# Patient Record
Sex: MALE | Race: WHITE | Employment: OTHER | URBAN - METROPOLITAN AREA
[De-identification: names, ages, dates, MRNs, and addresses within clinical notes are randomized per-mention and may not be internally consistent; named-entity substitution may affect disease eponyms.]

---

## 2016-09-13 LAB
ANION GAP SERPL CALCULATED.3IONS-SCNC: 5 MMOL/L
BUN SERPL-MCNC: 23 MG/DL
CALCIUM SERPL-MCNC: 9.3 MG/DL
CHLORIDE SERPLBLD-SCNC: 108 MMOL/L
CO2 SERPL-SCNC: 28 MMOL/L
CREAT SERPL-MCNC: 1.48 MG/DL
GFR SERPL CREATININE-BSD FRML MDRD: 44 ML/MIN/1.73M2
GLUCOSE SERPL-MCNC: 141 MG/DL (ref 70–99)
POTASSIUM SERPL-SCNC: 4.5 MMOL/L
SODIUM SERPL-SCNC: 141 MMOL/L

## 2017-01-04 ENCOUNTER — ANESTHESIA EVENT (OUTPATIENT)
Dept: SURGERY | Facility: CLINIC | Age: 82
End: 2017-01-04
Payer: MEDICARE

## 2017-01-04 ENCOUNTER — ANESTHESIA (OUTPATIENT)
Dept: SURGERY | Facility: CLINIC | Age: 82
End: 2017-01-04
Payer: MEDICARE

## 2017-01-04 PROCEDURE — S0077 INJECTION, CLINDAMYCIN PHOSP: HCPCS | Performed by: NEUROLOGICAL SURGERY

## 2017-01-04 PROCEDURE — 25000125 ZZHC RX 250: Performed by: NEUROLOGICAL SURGERY

## 2017-01-04 PROCEDURE — 25800025 ZZH RX 258: Performed by: NURSE ANESTHETIST, CERTIFIED REGISTERED

## 2017-01-04 PROCEDURE — 25000125 ZZHC RX 250: Performed by: NURSE ANESTHETIST, CERTIFIED REGISTERED

## 2017-01-04 RX ORDER — FENTANYL CITRATE 50 UG/ML
INJECTION, SOLUTION INTRAMUSCULAR; INTRAVENOUS PRN
Status: DISCONTINUED | OUTPATIENT
Start: 2017-01-04 | End: 2017-01-04

## 2017-01-04 RX ORDER — GLYCOPYRROLATE 0.2 MG/ML
INJECTION, SOLUTION INTRAMUSCULAR; INTRAVENOUS PRN
Status: DISCONTINUED | OUTPATIENT
Start: 2017-01-04 | End: 2017-01-04

## 2017-01-04 RX ORDER — PROPOFOL 10 MG/ML
INJECTION, EMULSION INTRAVENOUS CONTINUOUS PRN
Status: DISCONTINUED | OUTPATIENT
Start: 2017-01-04 | End: 2017-01-04

## 2017-01-04 RX ORDER — SODIUM CHLORIDE, SODIUM LACTATE, POTASSIUM CHLORIDE, CALCIUM CHLORIDE 600; 310; 30; 20 MG/100ML; MG/100ML; MG/100ML; MG/100ML
INJECTION, SOLUTION INTRAVENOUS CONTINUOUS PRN
Status: DISCONTINUED | OUTPATIENT
Start: 2017-01-04 | End: 2017-01-04

## 2017-01-04 RX ADMIN — PHENYLEPHRINE HYDROCHLORIDE 100 MCG: 10 INJECTION, SOLUTION INTRAMUSCULAR; INTRAVENOUS; SUBCUTANEOUS at 08:26

## 2017-01-04 RX ADMIN — PHENYLEPHRINE HYDROCHLORIDE 200 MCG: 10 INJECTION, SOLUTION INTRAMUSCULAR; INTRAVENOUS; SUBCUTANEOUS at 08:08

## 2017-01-04 RX ADMIN — FENTANYL CITRATE 50 MCG: 50 INJECTION, SOLUTION INTRAMUSCULAR; INTRAVENOUS at 07:36

## 2017-01-04 RX ADMIN — SODIUM CHLORIDE, POTASSIUM CHLORIDE, SODIUM LACTATE AND CALCIUM CHLORIDE: 600; 310; 30; 20 INJECTION, SOLUTION INTRAVENOUS at 07:28

## 2017-01-04 RX ADMIN — CLINDAMYCIN PHOSPHATE 900 MG: 18 INJECTION, SOLUTION INTRAVENOUS at 07:44

## 2017-01-04 RX ADMIN — PHENYLEPHRINE HYDROCHLORIDE 100 MCG: 10 INJECTION, SOLUTION INTRAMUSCULAR; INTRAVENOUS; SUBCUTANEOUS at 08:23

## 2017-01-04 RX ADMIN — PROPOFOL 75 MCG/KG/MIN: 10 INJECTION, EMULSION INTRAVENOUS at 07:38

## 2017-01-04 RX ADMIN — PHENYLEPHRINE HYDROCHLORIDE 100 MCG: 10 INJECTION, SOLUTION INTRAMUSCULAR; INTRAVENOUS; SUBCUTANEOUS at 08:05

## 2017-01-04 RX ADMIN — GLYCOPYRROLATE 0.2 MG: 0.2 INJECTION, SOLUTION INTRAMUSCULAR; INTRAVENOUS at 08:01

## 2017-01-04 ASSESSMENT — ENCOUNTER SYMPTOMS: ORTHOPNEA: 0

## 2017-01-04 ASSESSMENT — NEW YORK HEART ASSOCIATION (NYHA) CLASSIFICATION: NYHA FUNCTIONAL CLASS: I

## 2017-01-04 ASSESSMENT — LIFESTYLE VARIABLES: TOBACCO_USE: 1

## 2017-01-04 NOTE — ANESTHESIA POSTPROCEDURE EVALUATION
Patient: Paul Ingram    REPLACE DEEP BRAIN STIMULATION GENERATOR / BATTERY (Left Chest)  Additional InformationProcedure(s):  Replacement Of Left Deep Brain Stimulator Generator/Battery, Model Activa SC, Over The Left Chest Wall - Wound Class: I-Clean    Diagnosis:Essential Tremor  Diagnosis Additional Information: No value filed.    Anesthesia Type:  MAC    Note:  Anesthesia Post Evaluation    Patient location during evaluation: PACU  Patient participation: Able to fully participate in evaluation  Level of consciousness: awake and alert  Pain management: adequate  Airway patency: patent  Cardiovascular status: acceptable  Respiratory status: acceptable  Hydration status: acceptable  PONV: none     Anesthetic complications: None          Last vitals:  Filed Vitals:    01/04/17 0915 01/04/17 0930 01/04/17 0945   BP: 135/80 124/96 147/72   Pulse:      Temp: 36.4  C (97.6  F)     Resp: 16 16 16   SpO2: 96% 96% 95%       Electronically Signed By: Artemio Phillips MD  January 4, 2017  12:45 PM

## 2017-01-04 NOTE — ANESTHESIA CARE TRANSFER NOTE
Patient: Paul Ingram    REPLACE DEEP BRAIN STIMULATION GENERATOR / BATTERY (Left Chest)  Additional InformationProcedure(s):  Replacement Of Left Deep Brain Stimulator Generator/Battery, Model Activa SC, Over The Left Chest Wall - Wound Class: I-Clean    Diagnosis: Essential Tremor  Diagnosis Additional Information: No value filed.    Anesthesia Type:   MAC     Note:  Airway :Nasal Cannula  Patient transferred to:Phase II        Vitals: (Last set prior to Anesthesia Care Transfer)              Electronically Signed By: SIS Verdin CRNA  January 4, 2017  8:48 AM

## 2017-01-06 ENCOUNTER — CARE COORDINATION (OUTPATIENT)
Dept: NEUROSURGERY | Facility: CLINIC | Age: 82
End: 2017-01-06

## 2017-01-06 NOTE — PROGRESS NOTES
Neurosurgery Discharge Coordination Note     Attending physician:    Surgery performed: DBS battery replacement  Date of Discharge: 1/4/16  Discharge to: Home     Current status: Patient states he is feeling fine and does not have any pain. Denies redness, swelling,increased tenderness or elevated temp. Reports Incision CDI without signs of infection.  Denies current bowel or bladder issues    Discharge instructions and medications reviewed with patient.  Follow up appointments/imaging/tests needed: 1/16/17 at 3:00 - wound check with Dr. Rosenthal    RN triage/on call number given: [phone]/ [phone]

## 2017-01-16 ENCOUNTER — OFFICE VISIT (OUTPATIENT)
Dept: NEUROSURGERY | Facility: CLINIC | Age: 82
End: 2017-01-16

## 2017-01-16 VITALS
SYSTOLIC BLOOD PRESSURE: 95 MMHG | HEART RATE: 63 BPM | DIASTOLIC BLOOD PRESSURE: 46 MMHG | WEIGHT: 181.3 LBS | BODY MASS INDEX: 25.96 KG/M2 | HEIGHT: 70 IN

## 2017-01-16 DIAGNOSIS — Z45.42 END OF BATTERY LIFE OF DEEP BRAIN STIMULATOR: ICD-10-CM

## 2017-01-16 DIAGNOSIS — G25.0 ESSENTIAL TREMOR: Primary | ICD-10-CM

## 2017-01-16 ASSESSMENT — PAIN SCALES - GENERAL: PAINLEVEL: NO PAIN (0)

## 2017-01-16 NOTE — LETTER
1/16/2017       RE: Paul Ingram  [id number]  [place] [address]     Dear Colleague,    Thank you for referring your patient, Paul Ingram, to the [facility] NEUROS[facility]ERY at [facility]. Please see a copy of my visit note below.    HISTORY AND PHYSICAL EXAM    Chief Complaint   Patient presents with     RECHECK     S/p Activa SC generator over right chest wall replacement; Essential Tremor       HISTORY OF PRESENT ILLNESS  We saw Mr. Paul Ingram back in Neurosurgery Clinic for follow-up of his generator/battery replacement over his right chest wall. He is a 89 year old male with history of Essential Tremor. He underwent replacement of his generator/battery replacement, model Activa SC, over his right chest wall on 01/04/2017. The procedure was without complications and he was sent home as a same day patient. Today, he is doing well. He does not report a difference in his tremor since his battery change, but does notice an increase in his tremor when it is turned off. He denies any issues with his wounds and denies fevers, chills, nausea, vomiting, dizziness, or wound drainage.     Past Medical History   Diagnosis Date     Hypertension      Ischemic cardiomyopathy      Dyspnea on exertion      Coronary artery disease      Difficulty in walking(719.7)      uses cane     History of blood transfusion      family donation     Post herpetic neuralgia      Pulmonary embolism (H) 2013     Hyperlipidaemia      Heart attack (H) 7/2014     NSTEMI     PAD (peripheral artery disease) (H)      had stent left carotid--> removed     Abdominal aneurysm without mention of rupture      PUD (peptic ulcer disease) 1999?     hx of H Pylori     RBBB        Past Surgical History   Procedure Laterality Date     Deep brain stimulator  2008     Replace pulse generator subcutaneous  3/19/2013     Procedure: REPLACE PULSE GENERATOR SUBCUTANEOUS;  Left Deep Brain Stimulator  Battery/Generator Replacement;  Surgeon: Deepali Padilla MD;  Location: [facility] OR     Coronary artery bypass  83 and 3/99     83:X 4 grafts, 99: LIMA to LAD, SVG to PDA, SVG to 2nd OM     Carotid endarterectomy  2005     bilateral     Replace deep brain stimulation generator / battery Left 1/4/2017     Procedure: REPLACE DEEP BRAIN STIMULATION GENERATOR / BATTERY;  Surgeon: Lauri Rosenthal MD;  Location: [facility] OR       Family History   Problem Relation Age of Onset     CANCER Mother      C.A.D. Mother      DIABETES Father      CANCER Brother      C.A.D. Sister      [facility]     Social History     Social History     Marital Status:      Spouse Name: N/A     Number of Children: N/A     Years of Education: N/A     Occupational History     Not on file.     Social History Main Topics     Smoking status: Former Smoker -- 1.00 packs/day for 33 years     Types: Cigarettes, Pipe, Cigars     Quit date: 03/12/1959     Smokeless tobacco: Never Used     Alcohol Use: Yes      Comment: occ.     Drug Use: No     Sexual Activity: Not on file     Other Topics Concern     Caffeine Concern No     tea     Sleep Concern No     Special Diet No     Exercise No     some walking with errands     Seat Belt Yes     Social History Narrative    .  Lives with daughter on [address]/3 acre.  Active.    3 children - 1 with CAD and asbestosis exposure.    No family history of bleeding, clotting disorders or complications with anesthesia.            Allergies   Allergen Reactions     Aspirin      bleeding     Atorvastatin      Leg weakness     Penicillins Rash     Sulphadimidine [Sulfamethazine] Rash       Current Outpatient Prescriptions   Medication     naproxen sodium (ALEVE) 220 MG tablet     isosorbide mononitrate (IMDUR) 30 MG 24 hr tablet     lisinopril (PRINIVIL/ZESTRIL) 2.5 MG tablet     rosuvastatin (CRESTOR) 40 MG tablet     spironolactone (ALDACTONE) 25 MG tablet     carboxymethylcellulose (REFRESH PLUS) 0.5 % SOLN     fish oil-omega-3  fatty acids (FISH OIL) 1000 MG capsule     primidone (MYSOLINE) 50 MG tablet     metoprolol (TOPROL-XL) 25 MG 24 hr tablet     nitroglycerin (NITROSTAT) 0.4 MG SL tablet     No current facility-administered medications for this visit.       REVIEW OF SYSTEMS - Updated 1/16/17. Also see pertinent negatives in HPI.  General: Negative for chills/sweats/fever. difficulty sleeping, headache, recent fatigue, or weight gain/loss.  Eyes: POSITIVE for cataract surgery. Negative for blurred vision, crossed eyes, double vision, recent eye infections, vision flashes, or vision halos.  Ears/Nose/Mouth/Throat: POSITIVE for ear discharge, hearing loss, and nosebleeds. Negative for bleeding gums, difficulty swallowing, earache, hoarseness, tinnitus, or sinus problems.  Respiratory:Negative for chronic cough, coughing blood, night sweats, shortness of breath, Tuberculosis, or wheezing.  Cardiovascular: POSITIVE for chest pain and poor circulation. Negative for dyspnea at night, heart murmur, palpitations, pacemaker, pacemaker, swollen legs/feet, or varicose veins.  Gastrointestinal: POSITIVE for melena and hematochezia. Negative for chronic diarrhea, heartburn, Hepatitis A/B/C, increasing constipation, Liver Disease, nausea, or vomiting.   Genitourinary: Negative for Urinary retention, genital discharge, urinary incontinence, prostate problems, urgency, or UTI.   Neurological: POSITIVE for tingling in hands/arms/legs. Negative for syncope, headaches, numbness of arms/legs, memory problems, or seizures.  Psychological: Negative for anxiety, depression, panic attacks, or restlessness.  Skin: POSITIVE for chronic skin itching in chest and unusual moles. Negative for color changes in hand/feet when cold, poor scarring, non-healing ulcers, skin rashes/hives.  Musculoskeletal: Negative for arthritis, joint swelling in hands/wrists/hips/knees/joints, muscle tenderness in arms/legs, or osteoporosis.  Endocrine: Negative for excessive  "thirst/hunger, intolerance for warm rooms, loss of libido, multiple broken bones, rapid weight gain/loss, galactorrhea, or thyroid issues.  Hematologic/Lymphatic: POSITIVE for easy skin bruising. Negative for significant fatigue, prolonged bleeding, tender glands/lymph nodes.  Allergies: Negative for asthma or hay fever.      PHYSICAL EXAM  BP 95/46 mmHg  Pulse 63  Ht 1.778 m (5' 10\")  Wt 82.237 kg (181 lb 4.8 oz)  BMI 26.01 kg/m2    General: Awake, alert, oriented. Well nourished, well developed, he is not in any acute distress.  Incisions: Incision over right chest wall healing well. NO fuid collection or erythema. No exposed hardware.   Neurological  Awake, alert and oriented to date, time, place and person. Speech fluent.   Pupils equal, round, reactive to light.  Extraocular movement intact.  Facial sensation intact.  Face symmetric.  Moves all extremities well.   Sensation grossly intact.    ASSESSMENT  89 year old male with a history of Essential Tremor. S/p Activa SC generator replacement over right chest wall.    He did well with the surgery and his postoperative course has been uneventful. His wound is healing well.    PLAN  1. We will see him in clinic as needed.    I, Nancy Elizalde, am serving as a scribe to document services personally performed by Lauri Rosenthal MD, PhD, based upon my observations and the provider's statements to me. All documentation has been reviewed and edited by the aforementioned doctor prior to being entered into the official medical record.    I, Lauri Rosenthal, attest that above named individual is acting in scribe capacity, has observed my performance of the services and has documented them in accordance with my direction. The documentation recorded by the scribe accurately reflects the service I personally performed and the decisions made by me. The document was also partially recorded by me and the entire document was edited by me as well.     Again, thank you for " allowing me to participate in the care of your patient.      Sincerely,  Lauri Rosenthal MD

## 2017-01-16 NOTE — MR AVS SNAPSHOT
After Visit Summary   2017    Paul Ingram    MRN: [id number]           Patient Information     Date Of Birth          10/3/1918        Visit Information        Provider Department      2017 3:00 PM Lauri Rosenthal MD [facility] Neurosurgery        Today's Diagnoses     Essential tremor    -  1    End of battery life of deep brain stimulator           Follow-ups after your visit        Your next 10 appointments already scheduled     Dec 15, 2017 12:50 PM CST   (Arrive by 12:35 PM)   Return Movement Disorder with Manjeet Bragg MD   [facility] Neurology ([facility])    [address] [place] [id number] [phone]              Who to contact     Please call your clinic at [phone] to:    Ask questions about your health    Make or cancel appointments    Discuss your medicines    Learn about your test results    Speak to your doctor   If you have compliments or concerns about an experience at your clinic, or if you wish to file a complaint, please contact [facility] Patient Relations at [phone] or email us at [email or web address]         Additional Information About Your Visit        MyChart Information     Spoon[employer] is an electronic gateway that provides easy, online access to your medical records. With [employer], you can request a clinic appointment, read your test results, renew a prescription or communicate with your care team.     To sign up for [employer] visit the website at [email or web address]   You will be asked to enter the access code listed below, as well as some personal information. Please follow the directions to create your username and password.     Your access code is: [email or web address]  Expires: 2017  3:17 PM     Your access code will  in 90 days. If you need help or a new code, please contact your [facility] Clinic or call  "[phone] for assistance.        Care EveryWhere ID     This is your Care EveryWhere ID. This could be used by other organizations to access your [place] medical records  [id number]        Your Vitals Were     Pulse Height BMI (Body Mass Index)             63 1.778 m (5' 10\") 26.01 kg/m2          Blood Pressure from Last 3 Encounters:   05/19/17 110/62   04/25/17 143/54   01/16/17 95/46    Weight from Last 3 Encounters:   05/19/17 88.9 kg (196 lb)   04/25/17 88.5 kg (195 lb)   01/16/17 82.2 kg (181 lb 4.8 oz)              Today, you had the following     No orders found for display       Primary Care Provider Fax #    [facility] [phone] [address] [place] [address]        Thank you!     Thank you for choosing [facility]  for your care. Our goal is always to provide you with excellent care. Hearing back from our patients is one way we can continue to improve our services. Please take a few minutes to complete the written survey that you may receive in the mail after your visit with us. Thank you!             Your Updated Medication List - Protect others around you: Learn how to safely use, store and throw away your medicines at www.disposemymeds.org.          This list is accurate as of: 1/16/17 11:59 PM.  Always use your most recent med list.                   Brand Name Dispense Instructions for use    ALEVE 220 MG tablet   Generic drug:  naproxen sodium     60 tablet    Take 1 tablet (220 mg) by mouth daily       carboxymethylcellulose 0.5 % Soln ophthalmic solution    REFRESH PLUS     Place 1 drop into both eyes 3 times daily as needed for dry eyes       fish oil-omega-3 fatty acids 1000 MG capsule      Take 1 capsule by mouth 2 times daily.       isosorbide mononitrate 30 MG 24 hr tablet    IMDUR     Take 30 mg by mouth daily       metoprolol 25 MG 24 hr tablet    TOPROL-XL     Take 25 mg by mouth daily.       NITROSTAT 0.4 MG sublingual tablet "   Generic drug:  nitroglycerin      Place 1 tablet under the tongue every 5 minutes as needed.       primidone 50 MG tablet    MYSOLINE     Take 1 tablet by mouth 2 times daily.       spironolactone 25 MG tablet    ALDACTONE    45 tablet    Take 0.5 tablets (12.5 mg) by mouth daily

## 2017-01-16 NOTE — PROGRESS NOTES
HISTORY AND PHYSICAL EXAM    Chief Complaint   Patient presents with     RECHECK     S/p Activa SC generator over right chest wall replacement; Essential Tremor       HISTORY OF PRESENT ILLNESS  We saw Mr. Paul Ingram back in Neurosurgery Clinic for follow-up of his generator/battery replacement over his right chest wall. He is a 89 year old male with history of Essential Tremor. He underwent replacement of his generator/battery replacement, model Activa SC, over his right chest wall on 01/04/2017. The procedure was without complications and he was sent home as a same day patient. Today, he is doing well. He does not report a difference in his tremor since his battery change, but does notice an increase in his tremor when it is turned off. He denies any issues with his wounds and denies fevers, chills, nausea, vomiting, dizziness, or wound drainage.       Past Medical History   Diagnosis Date     Hypertension      Ischemic cardiomyopathy      Dyspnea on exertion      Coronary artery disease      Difficulty in walking(719.7)      uses cane     History of blood transfusion      family donation     Post herpetic neuralgia      Pulmonary embolism (H) 2013     Hyperlipidaemia      Heart attack (H) 7/2014     NSTEMI     PAD (peripheral artery disease) (H)      had stent left carotid--> removed     Abdominal aneurysm without mention of rupture      PUD (peptic ulcer disease) 1999?     hx of H Pylori     RBBB        Past Surgical History   Procedure Laterality Date     Deep brain stimulator  2008     Replace pulse generator subcutaneous  3/19/2013     Procedure: REPLACE PULSE GENERATOR SUBCUTANEOUS;  Left Deep Brain Stimulator Battery/Generator Replacement;  Surgeon: Deepali Padilla MD;  Location: [facility] OR     Coronary artery bypass  83 and 3/99     83:X 4 grafts, 99: LIMA to LAD, SVG to PDA, SVG to 2nd OM     Carotid endarterectomy  2005     bilateral     Replace deep brain stimulation generator / battery Left 1/4/2017      Procedure: REPLACE DEEP BRAIN STIMULATION GENERATOR / BATTERY;  Surgeon: Lauri Rosenthal MD;  Location: [facility] OR       Family History   Problem Relation Age of Onset     CANCER Mother      C.A.D. Mother      DIABETES Father      CANCER Brother      C.A.D. Sister      [facility]       Social History     Social History     Marital Status:      Spouse Name: N/A     Number of Children: N/A     Years of Education: N/A     Occupational History     Not on file.     Social History Main Topics     Smoking status: Former Smoker -- 1.00 packs/day for 33 years     Types: Cigarettes, Pipe, Cigars     Quit date: 03/12/1959     Smokeless tobacco: Never Used     Alcohol Use: Yes      Comment: occ.     Drug Use: No     Sexual Activity: Not on file     Other Topics Concern     Caffeine Concern No     tea     Sleep Concern No     Special Diet No     Exercise No     some walking with errands     Seat Belt Yes     Social History Narrative    .  Lives with daughter on [address] acre.  Active.    3 children - 1 with CAD and asbestosis exposure.    No family history of bleeding, clotting disorders or complications with anesthesia.              Allergies   Allergen Reactions     Aspirin      bleeding     Atorvastatin      Leg weakness     Penicillins Rash     Sulphadimidine [Sulfamethazine] Rash       Current Outpatient Prescriptions   Medication     naproxen sodium (ALEVE) 220 MG tablet     isosorbide mononitrate (IMDUR) 30 MG 24 hr tablet     lisinopril (PRINIVIL/ZESTRIL) 2.5 MG tablet     rosuvastatin (CRESTOR) 40 MG tablet     spironolactone (ALDACTONE) 25 MG tablet     carboxymethylcellulose (REFRESH PLUS) 0.5 % SOLN     fish oil-omega-3 fatty acids (FISH OIL) 1000 MG capsule     primidone (MYSOLINE) 50 MG tablet     metoprolol (TOPROL-XL) 25 MG 24 hr tablet     nitroglycerin (NITROSTAT) 0.4 MG SL tablet     No current facility-administered medications for this visit.       REVIEW OF SYSTEMS - Updated 1/16/17. Also see pertinent  negatives in HPI.  General: Negative for chills/sweats/fever. difficulty sleeping, headache, recent fatigue, or weight gain/loss.  Eyes: POSITIVE for cataract surgery. Negative for blurred vision, crossed eyes, double vision, recent eye infections, vision flashes, or vision halos.  Ears/Nose/Mouth/Throat: POSITIVE for ear discharge, hearing loss, and nosebleeds. Negative for bleeding gums, difficulty swallowing, earache, hoarseness, tinnitus, or sinus problems.  Respiratory:Negative for chronic cough, coughing blood, night sweats, shortness of breath, Tuberculosis, or wheezing.  Cardiovascular: POSITIVE for chest pain and poor circulation. Negative for dyspnea at night, heart murmur, palpitations, pacemaker, pacemaker, swollen legs/feet, or varicose veins.  Gastrointestinal: POSITIVE for melena and hematochezia. Negative for chronic diarrhea, heartburn, Hepatitis A/B/C, increasing constipation, Liver Disease, nausea, or vomiting.   Genitourinary: Negative for Urinary retention, genital discharge, urinary incontinence, prostate problems, urgency, or UTI.   Neurological: POSITIVE for tingling in hands/arms/legs. Negative for syncope, headaches, numbness of arms/legs, memory problems, or seizures.  Psychological: Negative for anxiety, depression, panic attacks, or restlessness.  Skin: POSITIVE for chronic skin itching in chest and unusual moles. Negative for color changes in hand/feet when cold, poor scarring, non-healing ulcers, skin rashes/hives.  Musculoskeletal: Negative for arthritis, joint swelling in hands/wrists/hips/knees/joints, muscle tenderness in arms/legs, or osteoporosis.  Endocrine: Negative for excessive thirst/hunger, intolerance for warm rooms, loss of libido, multiple broken bones, rapid weight gain/loss, galactorrhea, or thyroid issues.  Hematologic/Lymphatic: POSITIVE for easy skin bruising. Negative for significant fatigue, prolonged bleeding, tender glands/lymph nodes.  Allergies: Negative for  "asthma or hay fever.      PHYSICAL EXAM  BP 95/46 mmHg  Pulse 63  Ht 1.778 m (5' 10\")  Wt 82.237 kg (181 lb 4.8 oz)  BMI 26.01 kg/m2    General: Awake, alert, oriented. Well nourished, well developed, he is not in any acute distress.  Incisions: Incision over right chest wall healing well. NO fuid collection or erythema. No exposed hardware.     Neurological  Awake, alert and oriented to date, time, place and person. Speech fluent.   Pupils equal, round, reactive to light.  Extraocular movement intact.  Facial sensation intact.  Face symmetric.  Moves all extremities well.   Sensation grossly intact.      ASSESSMENT  89 year old male with a history of Essential Tremor. S/p Activa SC generator replacement over right chest wall.    He did well with the surgery and his postoperative course has been uneventful. His wound is healing well.      PLAN  1. We will see him in clinic as needed.    INancy, am serving as a scribe to document services personally performed by Lauri Rosenthal MD, PhD, based upon my observations and the provider's statements to me. All documentation has been reviewed and edited by the aforementioned doctor prior to being entered into the official medical record.    I, Lauri Rosenthal, attest that above named individual is acting in scribe capacity, has observed my performance of the services and has documented them in accordance with my direction. The documentation recorded by the scribe accurately reflects the service I personally performed and the decisions made by me. The document was also partially recorded by me and the entire document was edited by me as well.         "

## 2017-04-07 DIAGNOSIS — E78.2 MIXED HYPERLIPIDEMIA: ICD-10-CM

## 2017-04-07 RX ORDER — ROSUVASTATIN CALCIUM 40 MG/1
40 TABLET, COATED ORAL AT BEDTIME
Qty: 90 TABLET | Refills: 0 | Status: SHIPPED | OUTPATIENT
Start: 2017-04-07 | End: 2017-07-05

## 2017-04-19 ENCOUNTER — TELEPHONE (OUTPATIENT)
Dept: CARDIOLOGY | Facility: CLINIC | Age: 82
End: 2017-04-19

## 2017-04-19 ENCOUNTER — HOSPITAL ENCOUNTER (OUTPATIENT)
Dept: ULTRASOUND IMAGING | Facility: CLINIC | Age: 82
Discharge: HOME | End: 2017-04-19
Attending: INTERNAL MEDICINE | Admitting: INTERNAL MEDICINE
Payer: MEDICARE

## 2017-04-19 DIAGNOSIS — I25.10 CORONARY ARTERY DISEASE: Primary | ICD-10-CM

## 2017-04-19 DIAGNOSIS — I25.10 CORONARY ARTERY DISEASE: ICD-10-CM

## 2017-04-19 DIAGNOSIS — I71.40 ABDOMINAL AORTIC ANEURYSM WITHOUT RUPTURE (H): ICD-10-CM

## 2017-04-19 DIAGNOSIS — I25.10 CORONARY ARTERY DISEASE INVOLVING NATIVE CORONARY ARTERY OF NATIVE HEART WITHOUT ANGINA PECTORIS: ICD-10-CM

## 2017-04-19 LAB
ALT SERPL W P-5'-P-CCNC: <5 U/L (ref 5–30)
CHOLEST SERPL-MCNC: 154 MG/DL
HDLC SERPL-MCNC: 54 MG/DL
LDLC SERPL CALC-MCNC: 72 MG/DL
NONHDLC SERPL-MCNC: 100 MG/DL
TRIGL SERPL-MCNC: 141 MG/DL

## 2017-04-19 PROCEDURE — 80061 LIPID PANEL: CPT | Performed by: INTERNAL MEDICINE

## 2017-04-19 PROCEDURE — 36415 COLL VENOUS BLD VENIPUNCTURE: CPT | Performed by: INTERNAL MEDICINE

## 2017-04-19 PROCEDURE — 84460 ALANINE AMINO (ALT) (SGPT): CPT | Performed by: INTERNAL MEDICINE

## 2017-04-19 PROCEDURE — 93978 VASCULAR STUDY: CPT | Mod: 26 | Performed by: INTERNAL MEDICINE

## 2017-04-19 PROCEDURE — 93978 VASCULAR STUDY: CPT

## 2017-04-19 NOTE — TELEPHONE ENCOUNTER
Reviewed abdominal U/S showing   Impression:   Infrarenal fusiform abdominal aorta aneurysm measuring 5.3 cm x 4.9 cm with extensive intramural thrombus    Compared to prior study dated 03/29/2016  abdominal aortic aneurysm measured 5.1 x 4.6 cm in the previous study.   Intramural thrombus was also noted in previous study.    Pt has office visit 4/25/17 w. Dr. Dela Cruz; will message to review. LPenfield RN

## 2017-04-25 ENCOUNTER — OFFICE VISIT (OUTPATIENT)
Dept: CARDIOLOGY | Facility: CLINIC | Age: 82
End: 2017-04-25
Attending: INTERNAL MEDICINE
Payer: COMMERCIAL

## 2017-04-25 VITALS
WEIGHT: 195 LBS | HEIGHT: 70 IN | SYSTOLIC BLOOD PRESSURE: 143 MMHG | DIASTOLIC BLOOD PRESSURE: 54 MMHG | BODY MASS INDEX: 27.92 KG/M2 | HEART RATE: 63 BPM

## 2017-04-25 DIAGNOSIS — I25.10 CORONARY ARTERY DISEASE INVOLVING NATIVE CORONARY ARTERY OF NATIVE HEART WITHOUT ANGINA PECTORIS: ICD-10-CM

## 2017-04-25 DIAGNOSIS — I25.5 CARDIOMYOPATHY, ISCHEMIC: ICD-10-CM

## 2017-04-25 DIAGNOSIS — E78.2 MIXED HYPERLIPIDEMIA: ICD-10-CM

## 2017-04-25 DIAGNOSIS — I35.0 NONRHEUMATIC AORTIC VALVE STENOSIS: Primary | ICD-10-CM

## 2017-04-25 DIAGNOSIS — I10 ESSENTIAL HYPERTENSION: ICD-10-CM

## 2017-04-25 DIAGNOSIS — I71.40 ABDOMINAL AORTIC ANEURYSM WITHOUT RUPTURE (H): ICD-10-CM

## 2017-04-25 DIAGNOSIS — I50.20 SYSTOLIC CONGESTIVE HEART FAILURE, UNSPECIFIED CONGESTIVE HEART FAILURE CHRONICITY: ICD-10-CM

## 2017-04-25 DIAGNOSIS — I24.9 ACS (ACUTE CORONARY SYNDROME) (H): ICD-10-CM

## 2017-04-25 PROCEDURE — 99214 OFFICE O/P EST MOD 30 MIN: CPT | Performed by: INTERNAL MEDICINE

## 2017-04-25 RX ORDER — LISINOPRIL 2.5 MG/1
5 TABLET ORAL DAILY
Qty: 30 TABLET | Refills: 11 | Status: SHIPPED | OUTPATIENT
Start: 2017-04-25 | End: 2017-05-19

## 2017-04-25 NOTE — MR AVS SNAPSHOT
After Visit Summary   4/25/2017    Paul Ingram    MRN: [id number]           Patient Information     Date Of Birth          10/3/1918        Visit Information        Provider Department      4/25/2017 2:45 PM Shady Dela Cruz MD [facility] HEART [facility]        Today's Diagnoses     Nonrheumatic aortic valve stenosis    -  1    Coronary artery disease involving native coronary artery of native heart without angina pectoris        Essential hypertension        Mixed hyperlipidemia        Systolic congestive heart failure, unspecified congestive heart failure chronicity (H)        Cardiomyopathy, ischemic        ACS (acute coronary syndrome) (H)        Abdominal aortic aneurysm without rupture (H)           Follow-ups after your visit        Additional Services     Follow-Up with Cardiac Advanced Practice Provider           Follow-Up with Cardiologist                 Your next 10 appointments already scheduled     May 10, 2017 12:45 PM CDT   LAB with RU LAB   [facility] ([facility])    [address]  [facility] [id number] [phone]           Patient must bring picture ID.  Patient should be prepared to give a urine specimen  Please do not eat 10-12 hours before your appointment if you are coming in fasting for labs on lipids, cholesterol, or glucose (sugar).  Pregnant women should follow their Care Team instructions. Water with medications is okay. Do not drink coffee or other fluids.   If you have concerns about taking  your medications, please ask at office or if scheduling via Ready, send a message by clicking on Secure Messaging, Message Your Care Team.            May 10, 2017  1:00 PM CDT   Ech Complete with [id number] ([facility])    [address]  [facility] [id number] [phone]           1.  Please  bring or wear a comfortable two-piece outfit. 2.  You may eat, drink and take your normal medicines. 3.  For any questions that cannot be answered, please contact the ordering physician ***Please check-in at the [place] Registration Office located in [address] in the [facility] building. When you are finished registering, please go to [address] and have a seat. The technician will call your name for the test.            May 12, 2017 12:30 PM CDT   Return Visit with Brenda Johnson PA-C   [facility] [place] ([facility])    [address]  [facility] [id number] [phone]            Dec 15, 2017 12:50 PM CST   (Arrive by 12:35 PM)   Return Movement Disorder with Manjeet Bragg MD   [facility] ([facility])    [address] [address] [phone]              Future tests that were ordered for you today     Open Future Orders        Priority Expected Expires Ordered    Basic metabolic panel Routine 10/22/2017 4/25/2018 4/25/2017    Lipid Profile Routine 10/22/2017 4/25/2018 4/25/2017    ALT Routine 10/22/2017 4/25/2018 4/25/2017    Follow-Up with Cardiologist Routine 10/22/2017 4/25/2018 4/25/2017    Basic metabolic panel Routine 5/9/2017 4/25/2018 4/25/2017    Follow-Up with Cardiac Advanced Practice Provider Routine 5/9/2017 4/25/2018 4/25/2017    Echocardiogram Routine 5/9/2017 4/25/2018 4/25/2017            Who to contact     If you have questions or need follow up information about today's clinic visit or your schedule please contact [facility] [place] directly at [phone].  Normal or non-critical lab and imaging results will be communicated to you by MyChart, letter or phone within 4 business days after the clinic has received the results. If you do not hear from us within 7 days, please contact the clinic through MyChart or  "phone. If you have a critical or abnormal lab result, we will notify you by phone as soon as possible.  Submit refill requests through [employer] or call your pharmacy and they will forward the refill request to us. Please allow 3 business days for your refill to be completed.          Additional Information About Your Visit        [employer] Information     [employer] lets you send messages to your doctor, view your test results, renew your prescriptions, schedule appointments and more. To sign up, go to [email or web address] . Click on \"Log in\" on the left side of the screen, which will take you to the Welcome page. Then click on \"Sign up Now\" on the right side of the page.     You will be asked to enter the access code listed below, as well as some personal information. Please follow the directions to create your username and password.     Your access code is: [email or web address]  Expires: 2017  3:17 PM     Your access code will  in 90 days. If you need help or a new code, please call your [facility] clinic or [phone].        Care EveryWhere ID     This is your Care EveryWhere ID. This could be used by other organizations to access your [facility] medical records  [id number]        Your Vitals Were     Pulse Height BMI (Body Mass Index)             63 1.778 m (5' 10\") 27.98 kg/m2          Blood Pressure from Last 3 Encounters:   17 143/54   17 95/46   17 147/72    Weight from Last 3 Encounters:   17 88.5 kg (195 lb)   17 82.2 kg (181 lb 4.8 oz)   17 83.6 kg (184 lb 4.9 oz)              We Performed the Following     Follow-Up with Cardiologist          Today's Medication Changes          These changes are accurate as of: 17  3:17 PM.  If you have any questions, ask your nurse or doctor.               These medicines have changed or have updated prescriptions.        Dose/Directions    lisinopril 2.5 MG tablet   Commonly known as:  PRINIVIL/Zestril   This may have changed:  " how much to take   Used for:  Essential hypertension   Changed by:  Shady Dela Cruz MD        Dose:  5 mg   Take 2 tablets (5 mg) by mouth daily   Quantity:  30 tablet   Refills:  11            Where to get your medicines      These medications [id number] [address] [address] [facility]  [address] [facility] [address]     Phone:  [phone]     lisinopril 2.5 MG tablet                Primary Care Provider Office Phone # Fax #    [facility] [phone] [address] [place] [address]        Thank you!     Thank you for choosing [facility] [place]  for your care. Our goal is always to provide you with excellent care. Hearing back from our patients is one way we can continue to improve our services. Please take a few minutes to complete the written survey that you may receive in the mail after your visit with us. Thank you!             Your Updated Medication List - Protect others around you: Learn how to safely use, store and throw away your medicines at www.disposemymeds.org.          This list is accurate as of: 4/25/17  3:17 PM.  Always use your most recent med list.                   Brand Name Dispense Instructions for use    ALEVE 220 MG tablet   Generic drug:  naproxen sodium     60 tablet    Take 1 tablet (220 mg) by mouth daily       carboxymethylcellulose 0.5 % Soln ophthalmic solution    REFRESH PLUS     Place 1 drop into both eyes 3 times daily as needed for dry eyes       fish oil-omega-3 fatty acids 1000 MG capsule      Take 1 capsule by mouth 2 times daily.       isosorbide mononitrate 30 MG 24 hr tablet    IMDUR     Take 30 mg by mouth daily       lisinopril 2.5 MG tablet    PRINIVIL/Zestril    30 tablet    Take 2 tablets (5 mg) by mouth daily       metoprolol 25 MG 24 hr tablet    TOPROL-XL     Take 25 mg by mouth daily.       NITROSTAT 0.4 MG sublingual  tablet   Generic drug:  nitroglycerin      Place 1 tablet under the tongue every 5 minutes as needed.       primidone 50 MG tablet    MYSOLINE     Take 1 tablet by mouth 2 times daily.       rosuvastatin 40 MG tablet    CRESTOR    90 tablet    Take 1 tablet (40 mg) by mouth At Bedtime       spironolactone 25 MG tablet    ALDACTONE    45 tablet    Take 0.5 tablets (12.5 mg) by mouth daily

## 2017-04-25 NOTE — PROGRESS NOTES
HPI and Plan:   See dictation    Orders Placed This Encounter   Procedures     Basic metabolic panel     Basic metabolic panel     Lipid Profile     ALT     Follow-Up with Cardiac Advanced Practice Provider     Follow-Up with Cardiologist     Echocardiogram       Orders Placed This Encounter   Medications     lisinopril (PRINIVIL/ZESTRIL) 2.5 MG tablet     Sig: Take 2 tablets (5 mg) by mouth daily     Dispense:  30 tablet     Refill:  11       Medications Discontinued During This Encounter   Medication Reason     lisinopril (PRINIVIL/ZESTRIL) 2.5 MG tablet Reorder         Encounter Diagnoses   Name Primary?     Coronary artery disease involving native coronary artery of native heart without angina pectoris      Essential hypertension      Mixed hyperlipidemia      Systolic congestive heart failure, unspecified congestive heart failure chronicity (H)      Cardiomyopathy, ischemic      ACS (acute coronary syndrome) (H)      Abdominal aortic aneurysm without rupture (H)      Nonrheumatic aortic valve stenosis Yes       CURRENT MEDICATIONS:  Current Outpatient Prescriptions   Medication Sig Dispense Refill     lisinopril (PRINIVIL/ZESTRIL) 2.5 MG tablet Take 2 tablets (5 mg) by mouth daily 30 tablet 11     rosuvastatin (CRESTOR) 40 MG tablet Take 1 tablet (40 mg) by mouth At Bedtime 90 tablet 0     naproxen sodium (ALEVE) 220 MG tablet Take 1 tablet (220 mg) by mouth daily 60 tablet      isosorbide mononitrate (IMDUR) 30 MG 24 hr tablet Take 30 mg by mouth daily       spironolactone (ALDACTONE) 25 MG tablet Take 0.5 tablets (12.5 mg) by mouth daily 45 tablet 3     carboxymethylcellulose (REFRESH PLUS) 0.5 % SOLN Place 1 drop into both eyes 3 times daily as needed for dry eyes       fish oil-omega-3 fatty acids (FISH OIL) 1000 MG capsule Take 1 capsule by mouth 2 times daily.       primidone (MYSOLINE) 50 MG tablet Take 1 tablet by mouth 2 times daily.       metoprolol (TOPROL-XL) 25 MG 24 hr tablet Take 25 mg by mouth  daily.       nitroglycerin (NITROSTAT) 0.4 MG SL tablet Place 1 tablet under the tongue every 5 minutes as needed.       [DISCONTINUED] lisinopril (PRINIVIL/ZESTRIL) 2.5 MG tablet Take 2.5 mg by mouth daily          ALLERGIES     Allergies   Allergen Reactions     Aspirin      bleeding     Atorvastatin      Leg weakness     Penicillins Rash     Sulphadimidine [Sulfamethazine] Rash       PAST MEDICAL HISTORY:  Past Medical History:   Diagnosis Date     Abdominal aneurysm without mention of rupture      Cardiomyopathy, ischemic 7/29/2014     CKD (chronic kidney disease)      Coronary artery disease      Difficulty in walking(719.7)     uses cane     Dyspnea on exertion      Heart attack (H) 7/2014    NSTEMI     History of blood transfusion     family donation     Hyperlipidaemia      Hypertension      Ischemic cardiomyopathy      PAD (peripheral artery disease) (H)     had stent left carotid--> removed     Post herpetic neuralgia      PUD (peptic ulcer disease) 1999?    hx of H Pylori     Pulmonary embolism (H) 2013     RBBB      Seborrheic keratosis      Tremor        PAST SURGICAL HISTORY:  Past Surgical History:   Procedure Laterality Date     CAROTID ENDARTERECTOMY  2005    bilateral     CORONARY ARTERY BYPASS  83 and 3/99    83:X 4 grafts, 99: LIMA to LAD, SVG to PDA, SVG to 2nd OM     deep brain stimulator  2008     REPLACE DEEP BRAIN STIMULATION GENERATOR / BATTERY Left 1/4/2017    Procedure: REPLACE DEEP BRAIN STIMULATION GENERATOR / BATTERY;  Surgeon: Lauri Rosenthal MD;  Location: [facility] OR     REPLACE PULSE GENERATOR SUBCUTANEOUS  3/19/2013    Procedure: REPLACE PULSE GENERATOR SUBCUTANEOUS;  Left Deep Brain Stimulator Battery/Generator Replacement;  Surgeon: Deepali Padilla MD;  Location: [facility] OR       FAMILY HISTORY:  Family History   Problem Relation Age of Onset     CANCER Mother      C.A.D. Mother      DIABETES Father      CANCER Brother      C.A.D. Sister      OHS       SOCIAL HISTORY:  Social  "History     Social History     Marital status:      Spouse name: N/A     Number of children: N/A     Years of education: N/A     Social History Main Topics     Smoking status: Former Smoker     Packs/day: 1.00     Years: 33.00     Types: Cigarettes, Pipe, Cigars     Quit date: 3/12/1959     Smokeless tobacco: Never Used     Alcohol use Yes      Comment: occ.     Drug use: No     Sexual activity: Not Asked     Other Topics Concern     Caffeine Concern No     tea     Sleep Concern No     Special Diet No     Exercise No     some walking with errands     Seat Belt Yes     Social History Narrative    .  Lives with daughter on [address]/3 acre.  Active.    3 children - 1 with CAD and asbestosis exposure.    No family history of bleeding, clotting disorders or complications with anesthesia.           Review of Systems:  Skin:  Negative       Eyes:  Positive for glasses    ENT:  Negative      Respiratory:  Negative       Cardiovascular:  Negative      Gastroenterology: Negative      Genitourinary:  not assessed      Musculoskeletal:  Positive for arthritis    Neurologic:  Positive for tremors    Psychiatric:  Negative      Heme/Lymph/Imm:  Negative      Endocrine:  Negative        Physical Exam:  Vitals: /54  Pulse 63  Ht 1.778 m (5' 10\")  Wt 88.5 kg (195 lb)  BMI 27.98 kg/m2    Constitutional:  cooperative, alert and oriented, well developed, well nourished, in no acute distress;cooperative        Skin:  warm and dry to the touch, no apparent skin lesions or masses noted        Head:  normocephalic, no masses or lesions        Eyes:  pupils equal and round, conjunctivae and lids unremarkable, sclera white, no xanthalasma, EOMS intact, no nystagmus        ENT:  no pallor or cyanosis, dentition good        Neck:  carotid pulses are full and equal bilaterally, JVP normal, no carotid bruit, no thyromegaly        Chest:  normal breath sounds, clear to auscultation, normal A-P diameter, normal symmetry, normal " respiratory excursion, no use of accessory muscles          Cardiac: regular rhythm;normal S1 and S2   S4   systolic ejection murmur;grade 2;radiation to the carotid;RUSB          Abdomen:  abdomen soft;non-tender;no bruits   ENALARED ABDOMINAL AORTA. ANEURYSM IS NON TENDER    Vascular: pulses full and equal, no bruits auscultated                                        Extremities and Back:  no deformities, clubbing, cyanosis, erythema observed;no edema              Neurological:  affect appropriate, oriented to time, person and place course tremors (Coarse tremors)            CC  Shady Dela Cruz MD   [facility]  [address]  [place] [address]

## 2017-04-26 NOTE — PROGRESS NOTES
REFERRING PHYSICIAN:  Patel Meneses [facility]      HISTORY OF PRESENT ILLNESS:  It is my pleasure to see your patient, Paul Ingram, a very pleasant 89-year-old gentleman with a history of coronary artery disease with multiple stents placed in the past with an ischemic cardiomyopathy and a past history of congestive heart failure.  The patient also has a history of moderate aortic stenosis.  He has an abdominal aortic aneurysm.  Ultrasound of the abdomen was performed approximately a week ago showing an infrarenal fusiform aneurysm measuring 5.3 x 4.9 cm.  A year ago the aneurysm was 5.1 x 4.6 cm.  However, in 2014 the aneurysm measured 5.2 x 4.8 so it is quite similar to was what it was in 2014.  We are probably seeing inter-observer and inter-technician differences.  The patient was seen by Dr. Maury Llanos from our Vascular Service in 2014.  At that stage, Mr. Ingram clearly stated that he would not have any surgery done to that, that he would prefer to have it treated conservatively.  He is not complaining of abdominal discomfort and he is not complaining of back discomfort or flank pain.      His blood pressure appears to be on the higher side here today at 143/54.   I rechecked his blood pressure and got a blood pressure of 146/72 so he does appear to have higher blood pressure.  I do note that you did add in lisinopril 2.5 mg daily but it does appear that his blood pressure is still on the higher side.  It will be important to try and get the systolic blood pressure down below 120 because of the abdominal aortic aneurysm.      The patient's lipids on 04/19/2017 were good with an LDL of 72, HDL of 54 and triglycerides of 141.  His liver function tests are normal with an ALT of less than 5.        He is not complaining of chest pains or chest pressure or undue shortness of breath.  His last echocardiogram was in 2014 and at that stage he was noted to have moderate aortic stenosis with a valve area of 1.0  cm2 and a mean gradient of 16 mmHg.  His EF was felt to be 40%-45% at that time with moderate apical and severe inferolateral hypokinesis.      IMPRESSION:   1.  Essential hypertension.  His blood pressure does not appear to be well controlled and this is important from the point of view of his abdominal aortic aneurysm.  I would like to see the systolic pressures in the 120s.  This is despite the fact that lisinopril was recently introduced at the 2.5 mg dose.   2.  Coronary artery disease.  The patient is asymptomatic with respect to coronary artery disease with no symptoms of angina pectoris.   3.  Ischemic cardiomyopathy.  The patient's ejection fraction is 40%-45%.  He has no symptoms or signs of congestive heart failure.   4.  Moderate aortic stenosis with the last echocardiogram being almost 3 years ago.   5.  Hyperlipidemia.  His lipids are excellent.     6.  Infrarenal abdominal aortic aneurysm which is quite similar in size to what it was in 2014.  The patient has no symptoms from this and wants conservative therapy alone.   7.  Renal insufficiency.      PLAN:  I will increase the dose of lisinopril from 2.5 mg to 5 mg.  I will have the patient follow up in 1-2 weeks' time in [place] where he is close to and we will check a basic metabolic profile and blood pressure at that time and we will also check an echocardiogram to see his aortic valve.  I will follow with the patient myself in 6 months' time.      It has been a pleasure to be involved in the care of this very nice patient.  As always, he has been told to contact me if has any questions or concerns.      Shady Elizalde MD       cc:   [facility] [address] [facility]   [address]  [address]         SHADY ELIZALDE MD, [facility]             D: 2017 15:13   T: 2017 14:52   MT: AMIE      Name:     RENAY FULLER   MRN:      [id number]        Account:      [id number]   :      10/03/1918           Service Date:  04/25/2017      Document: [id number]

## 2017-05-10 ENCOUNTER — DOCUMENTATION ONLY (OUTPATIENT)
Dept: CARDIOLOGY | Facility: CLINIC | Age: 82
End: 2017-05-10

## 2017-05-10 ENCOUNTER — HOSPITAL ENCOUNTER (OUTPATIENT)
Dept: CARDIOLOGY | Facility: CLINIC | Age: 82
Discharge: HOME | End: 2017-05-10
Attending: INTERNAL MEDICINE | Admitting: INTERNAL MEDICINE
Payer: MEDICARE

## 2017-05-10 DIAGNOSIS — Z53.9 ERRONEOUS ENCOUNTER--DISREGARD: Primary | ICD-10-CM

## 2017-05-10 DIAGNOSIS — I35.0 NONRHEUMATIC AORTIC VALVE STENOSIS: ICD-10-CM

## 2017-05-10 DIAGNOSIS — I10 ESSENTIAL HYPERTENSION: Primary | ICD-10-CM

## 2017-05-10 DIAGNOSIS — I10 ESSENTIAL HYPERTENSION: ICD-10-CM

## 2017-05-10 LAB
ANION GAP SERPL CALCULATED.3IONS-SCNC: 5 MMOL/L (ref 3–14)
BUN SERPL-MCNC: 22 MG/DL (ref 7–30)
CALCIUM SERPL-MCNC: 8.9 MG/DL (ref 8.5–10.1)
CHLORIDE SERPL-SCNC: 109 MMOL/L (ref 94–109)
CO2 SERPL-SCNC: 30 MMOL/L (ref 20–32)
CREAT SERPL-MCNC: 1.45 MG/DL (ref 0.66–1.25)
GFR SERPL CREATININE-BSD FRML MDRD: 45 ML/MIN/1.7M2
GLUCOSE SERPL-MCNC: 87 MG/DL (ref 70–99)
POTASSIUM SERPL-SCNC: 4.7 MMOL/L (ref 3.4–5.3)
SODIUM SERPL-SCNC: 144 MMOL/L (ref 133–144)

## 2017-05-10 PROCEDURE — 93306 TTE W/DOPPLER COMPLETE: CPT | Mod: 26 | Performed by: INTERNAL MEDICINE

## 2017-05-10 PROCEDURE — 93306 TTE W/DOPPLER COMPLETE: CPT

## 2017-05-10 PROCEDURE — 36415 COLL VENOUS BLD VENIPUNCTURE: CPT | Performed by: INTERNAL MEDICINE

## 2017-05-10 PROCEDURE — 40000264 ECHO COMPLETE WITH OPTISON

## 2017-05-10 PROCEDURE — 80048 BASIC METABOLIC PNL TOTAL CA: CPT | Performed by: INTERNAL MEDICINE

## 2017-05-10 PROCEDURE — 25500064 ZZH RX 255 OP 636: Performed by: INTERNAL MEDICINE

## 2017-05-10 RX ADMIN — HUMAN ALBUMIN MICROSPHERES AND PERFLUTREN 3 ML: 10; .22 INJECTION, SOLUTION INTRAVENOUS at 13:54

## 2017-05-19 ENCOUNTER — OFFICE VISIT (OUTPATIENT)
Dept: CARDIOLOGY | Facility: CLINIC | Age: 82
End: 2017-05-19
Attending: INTERNAL MEDICINE
Payer: COMMERCIAL

## 2017-05-19 ENCOUNTER — TELEPHONE (OUTPATIENT)
Dept: CARDIOLOGY | Facility: CLINIC | Age: 82
End: 2017-05-19

## 2017-05-19 VITALS
BODY MASS INDEX: 28.06 KG/M2 | DIASTOLIC BLOOD PRESSURE: 62 MMHG | HEART RATE: 54 BPM | SYSTOLIC BLOOD PRESSURE: 110 MMHG | HEIGHT: 70 IN | OXYGEN SATURATION: 95 % | WEIGHT: 196 LBS

## 2017-05-19 DIAGNOSIS — I10 ESSENTIAL HYPERTENSION: ICD-10-CM

## 2017-05-19 DIAGNOSIS — I35.0 NONRHEUMATIC AORTIC VALVE STENOSIS: ICD-10-CM

## 2017-05-19 PROCEDURE — 99214 OFFICE O/P EST MOD 30 MIN: CPT | Performed by: PHYSICIAN ASSISTANT

## 2017-05-19 RX ORDER — LISINOPRIL 2.5 MG/1
2.5 TABLET ORAL 2 TIMES DAILY
Qty: 60 TABLET | Refills: 11 | Status: SHIPPED | OUTPATIENT
Start: 2017-05-19 | End: 2017-12-28

## 2017-05-19 NOTE — TELEPHONE ENCOUNTER
----- Message from Brenda Johnson PA-C sent at 5/19/2017  4:03 PM CDT -----  Regarding: Thoughts  BP is better today with increase in lisinopril.   With his CAD, AAA and moderate-severe AS I didn't want to adjust anything as he is feeling well.   Echo mentioned possible low flow low gradient AS.   When he sees you in October do you want to repeat an echo or get a dobutamine echo?? Or wait to decide until then?  Brenda

## 2017-05-19 NOTE — PROGRESS NOTES
HPI and Plan:   See dictation  [id number]    Orders this Visit:  No orders of the defined types were placed in this encounter.    Orders Placed This Encounter   Medications     lisinopril (PRINIVIL/ZESTRIL) 2.5 MG tablet     Sig: Take 1 tablet (2.5 mg) by mouth 2 times daily     Dispense:  60 tablet     Refill:  11     Medications Discontinued During This Encounter   Medication Reason     lisinopril (PRINIVIL/ZESTRIL) 2.5 MG tablet Reorder         Encounter Diagnoses   Name Primary?     Essential hypertension      Nonrheumatic aortic valve stenosis        CURRENT MEDICATIONS:  Current Outpatient Prescriptions   Medication Sig Dispense Refill     lisinopril (PRINIVIL/ZESTRIL) 2.5 MG tablet Take 1 tablet (2.5 mg) by mouth 2 times daily 60 tablet 11     rosuvastatin (CRESTOR) 40 MG tablet Take 1 tablet (40 mg) by mouth At Bedtime 90 tablet 0     naproxen sodium (ALEVE) 220 MG tablet Take 1 tablet (220 mg) by mouth daily 60 tablet      isosorbide mononitrate (IMDUR) 30 MG 24 hr tablet Take 30 mg by mouth daily       spironolactone (ALDACTONE) 25 MG tablet Take 0.5 tablets (12.5 mg) by mouth daily 45 tablet 3     carboxymethylcellulose (REFRESH PLUS) 0.5 % SOLN Place 1 drop into both eyes 3 times daily as needed for dry eyes       fish oil-omega-3 fatty acids (FISH OIL) 1000 MG capsule Take 1 capsule by mouth 2 times daily.       primidone (MYSOLINE) 50 MG tablet Take 1 tablet by mouth 2 times daily.       metoprolol (TOPROL-XL) 25 MG 24 hr tablet Take 25 mg by mouth daily.       nitroglycerin (NITROSTAT) 0.4 MG SL tablet Place 1 tablet under the tongue every 5 minutes as needed.       [DISCONTINUED] lisinopril (PRINIVIL/ZESTRIL) 2.5 MG tablet Take 2 tablets (5 mg) by mouth daily 30 tablet 11       ALLERGIES  Allergies   Allergen Reactions     Aspirin      bleeding     Atorvastatin      Leg weakness     Penicillins Rash     Sulphadimidine [Sulfamethazine] Rash       PAST MEDICAL HISTORY:  Past Medical History:   Diagnosis  Date     Abdominal aneurysm without mention of rupture      Cardiomyopathy, ischemic 7/29/2014     CKD (chronic kidney disease)      Coronary artery disease      Difficulty in walking(719.7)     uses cane     Dyspnea on exertion      Heart attack (H) 7/2014    NSTEMI     History of blood transfusion     family donation     Hyperlipidaemia      Hypertension      Ischemic cardiomyopathy      PAD (peripheral artery disease) (H)     had stent left carotid--> removed     Post herpetic neuralgia      PUD (peptic ulcer disease) 1999?    hx of H Pylori     Pulmonary embolism (H) 2013     RBBB      Seborrheic keratosis      Tremor        PAST SURGICAL HISTORY:  Past Surgical History:   Procedure Laterality Date     CAROTID ENDARTERECTOMY  2005    bilateral     CORONARY ARTERY BYPASS  83 and 3/99    83:X 4 grafts, 89: LIMA to LAD, SVG to PDA, SVG to 2nd OM     deep brain stimulator  2008     REPLACE DEEP BRAIN STIMULATION GENERATOR / BATTERY Left 1/4/2017    Procedure: REPLACE DEEP BRAIN STIMULATION GENERATOR / BATTERY;  Surgeon: Lauri Rosenthal MD;  Location: [facility] OR     REPLACE PULSE GENERATOR SUBCUTANEOUS  3/19/2013    Procedure: REPLACE PULSE GENERATOR SUBCUTANEOUS;  Left Deep Brain Stimulator Battery/Generator Replacement;  Surgeon: Deepail Padilla MD;  Location: [facility] OR       FAMILY HISTORY:  Family History   Problem Relation Age of Onset     CANCER Mother      C.A.D. Mother      DIABETES Father      CANCER Brother      C.A.D. Sister      OHS       SOCIAL HISTORY:  Social History     Social History     Marital status:      Spouse name: N/A     Number of children: N/A     Years of education: N/A     Social History Main Topics     Smoking status: Former Smoker     Packs/day: 1.00     Years: 33.00     Types: Cigarettes, Pipe, Cigars     Quit date: 3/12/1959     Smokeless tobacco: Never Used     Alcohol use Yes      Comment: occ.     Drug use: No     Sexual activity: Not Asked     Other Topics Concern     Caffeine  "Concern No     tea     Sleep Concern No     Special Diet No     Exercise No     some walking with errands     Seat Belt Yes     Social History Narrative    .  Lives with daughter on 1/3 acre.  Active.    3 children - 1 with CAD and asbestosis exposure.    No family history of bleeding, clotting disorders or complications with anesthesia.           Review of Systems:  Skin:  Positive for bruising   Eyes:  Positive for glasses  ENT:  Positive for hearing loss;sinus trouble  Respiratory:  Negative    Cardiovascular:    Positive for;dizziness;lightheadedness;edema  Gastroenterology: Negative    Genitourinary:  Positive for nocturia  Musculoskeletal:  Positive for joint pain;joint stiffness  Neurologic:  Positive for tremors;numbness or tingling of hands  Psychiatric:  Negative    Heme/Lymph/Imm:  Positive for allergies  Endocrine:  Negative        Physical Exam:  Vitals: /62  Pulse 54  Ht 1.778 m (5' 10\")  Wt 88.9 kg (196 lb)  SpO2 95%  BMI 28.12 kg/m2    Constitutional:  cooperative, alert and oriented, well developed, well nourished, in no acute distress;cooperative        Skin:  warm and dry to the touch   thin skin, abrasion on right forearm that is wrapped/bandaged     Head:  normocephalic, no masses or lesions        Eyes:  pupils equal and round;conjunctivae and lids unremarkable;sclera white        ENT:  no pallor or cyanosis        Neck:  not assessed this visit transmitted murmur      Chest:  normal breath sounds, clear to auscultation, normal A-P diameter, normal symmetry, normal respiratory excursion, no use of accessory muscles        Cardiac: regular rhythm;normal S1 and S2   S4   systolic ejection murmur;grade 2;radiation to the carotid;RUSB          Abdomen:  abdomen soft;non-tender;BS normoactive   ENALARED ABDOMINAL AORTA. ANEURYSM IS NON TENDER    Vascular: not assessed this visit                                      Extremities and Back:  no deformities, clubbing, cyanosis, erythema " observed;no edema        Neurological:  affect appropriate, oriented to time, person and place course tremors using a cane      Recent Lab Results:  LIPID RESULTS:  Lab Results   Component Value Date    CHOL 154 04/19/2017    HDL 54 04/19/2017    LDL 72 04/19/2017    TRIG 141 04/19/2017    CHOLHDLRATIO 3.1 07/10/2014       LIVER ENZYME RESULTS:  Lab Results   Component Value Date    AST 33 04/21/2009    ALT <5 (L) 04/19/2017       CBC RESULTS:  Lab Results   Component Value Date    WBC 6.8 12/22/2016    RBC 4.40 12/22/2016    HGB 14.0 12/22/2016    HCT 42.8 12/22/2016    MCV 97 12/22/2016    MCH 31.8 12/22/2016    MCHC 32.7 12/22/2016    RDW 12.0 12/22/2016     (L) 12/22/2016       BMP RESULTS:  Lab Results   Component Value Date     05/10/2017    POTASSIUM 4.7 05/10/2017    CHLORIDE 109 05/10/2017    CO2 30 05/10/2017    ANIONGAP 5 05/10/2017    GLC 87 05/10/2017    BUN 22 05/10/2017    CR 1.45 (H) 05/10/2017    GFRESTIMATED 45 (L) 05/10/2017    GFRESTBLACK 54 (L) 05/10/2017    FAUZIA 8.9 05/10/2017        A1C RESULTS:  No results found for: A1C    INR RESULTS:  Lab Results   Component Value Date    INR 1.21 (H) 01/04/2017    INR 1.08 03/19/2013           CC  Shady Dela Cruz MD   [facility]  [address]  [place] [id number]

## 2017-05-19 NOTE — MR AVS SNAPSHOT
"              After Visit Summary   5/19/2017    Paul Ingram    MRN: [id number]           Patient Information     Date Of Birth          10/3/1918        Visit Information        Provider Department      5/19/2017 3:10 PM Brenda Johnson PA-C [facility] [place]        Today's Diagnoses     Essential hypertension        Nonrheumatic aortic valve stenosis           Follow-ups after your visit        Your next 10 appointments already scheduled     Dec 15, 2017 12:50 PM CST   (Arrive by 12:35 PM)   Return Movement Disorder with Manjeet Bragg MD   [facility] ([facility] [place])    [address] [place] [id number] [phone]              Future tests that were ordered for you today     Open Future Orders        Priority Expected Expires Ordered    Echocardiogram Complete Routine 10/22/2017 5/19/2018 5/19/2017            Who to contact     If you have questions or need follow up information about today's clinic visit or your schedule please contact [facility] [place] directly at [phone].  Normal or non-critical lab and imaging results will be communicated to you by MyChart, letter or phone within 4 business days after the clinic has received the results. If you do not hear from us within 7 days, please contact the clinic through [employer] or phone. If you have a critical or abnormal lab result, we will notify you by phone as soon as possible.  Submit refill requests through [employer] or call your pharmacy and they will forward the refill request to us. Please allow 3 business days for your refill to be completed.          Additional Information About Your Visit        MyChart Information     [employer] lets you send messages to your doctor, view your test results, renew your prescriptions, schedule appointments and more. To sign up, go to [email or web address] . Click on \"Log in\" on the " "left side of the screen, which will take you to the Welcome page. Then click on \"Sign up Now\" on the right side of the page.     You will be asked to enter the access code listed below, as well as some personal information. Please follow the directions to create your username and password.     Your access code is: [email or web address]  Expires: 2017  3:17 PM     Your access code will  in 90 days. If you need help or a new code, please call your [place] clinic or [phone].        Care EveryWhere ID     This is your Care EveryWhere ID. This could be used by other organizations to access your [place] medical records  [id number]        Your Vitals Were     Pulse Height Pulse Oximetry BMI (Body Mass Index)          54 1.778 m (5' 10\") 95% 28.12 kg/m2         Blood Pressure from Last 3 Encounters:   17 110/62   17 143/54   17 95/46    Weight from Last 3 Encounters:   17 88.9 kg (196 lb)   17 88.5 kg (195 lb)   17 82.2 kg (181 lb 4.8 oz)              We Performed the Following     Follow-Up with Cardiac Advanced Practice Provider          Today's Medication Changes          These changes are accurate as of: 17  5:18 PM.  If you have any questions, ask your nurse or doctor.               These medicines have changed or have updated prescriptions.        Dose/Directions    lisinopril 2.5 MG tablet   Commonly known as:  PRINIVIL/Zestril   This may have changed:    - how much to take  - when to take this   Used for:  Essential hypertension   Changed by:  Brenda Johnson PA-C        Dose:  2.5 mg   Take 1 tablet (2.5 mg) by mouth 2 times daily   Quantity:  60 tablet   Refills:  11            Where to get your medicines      These medications [id number] [address] [facility]  [address] [facility] [address]     Phone:  [phone]     lisinopril 2.5 MG tablet                Primary Care Provider Office Phone # Fax #    " [facility] [phone] [address] [place] [address]        Thank you!     Thank you for choosing [facility] [place]  for your care. Our goal is always to provide you with excellent care. Hearing back from our patients is one way we can continue to improve our services. Please take a few minutes to complete the written survey that you may receive in the mail after your visit with us. Thank you!             Your Updated Medication List - Protect others around you: Learn how to safely use, store and throw away your medicines at www.disposemymeds.org.          This list is accurate as of: 5/19/17  5:18 PM.  Always use your most recent med list.                   Brand Name Dispense Instructions for use    ALEVE 220 MG tablet   Generic drug:  naproxen sodium     60 tablet    Take 1 tablet (220 mg) by mouth daily       carboxymethylcellulose 0.5 % Soln ophthalmic solution    REFRESH PLUS     Place 1 drop into both eyes 3 times daily as needed for dry eyes       fish oil-omega-3 fatty acids 1000 MG capsule      Take 1 capsule by mouth 2 times daily.       isosorbide mononitrate 30 MG 24 hr tablet    IMDUR     Take 30 mg by mouth daily       lisinopril 2.5 MG tablet    PRINIVIL/Zestril    60 tablet    Take 1 tablet (2.5 mg) by mouth 2 times daily       metoprolol 25 MG 24 hr tablet    TOPROL-XL     Take 25 mg by mouth daily.       NITROSTAT 0.4 MG sublingual tablet   Generic drug:  nitroglycerin      Place 1 tablet under the tongue every 5 minutes as needed.       primidone 50 MG tablet    MYSOLINE     Take 1 tablet by mouth 2 times daily.       rosuvastatin 40 MG tablet    CRESTOR    90 tablet    Take 1 tablet (40 mg) by mouth At Bedtime       spironolactone 25 MG tablet    ALDACTONE    45 tablet    Take 0.5 tablets (12.5 mg) by mouth daily

## 2017-05-19 NOTE — TELEPHONE ENCOUNTER
I would would just repeat the echo. We are not going to operate on him to do a  echo to if low flow AS. He wants conservative Rx. Thx

## 2017-05-19 NOTE — LETTER
5/19/2017    [facility]  [address] [place] [address]    RE: Paul Ingram       Dear Colleague,    I had the pleasure of seeing Paul Ingram in the [facility].    I had the pleasure of meeting Paul Ingram today in the Cardiology Clinic for followup of his hypertension, coronary artery disease, aortic stenosis.  Paul is a patient of Dr. Dela Cruz.      Paul is a very pleasant 89-year-old gentleman who appears very well for his age who has a history of coronary artery disease with multiple stents placed in the past, ischemic cardiomyopathy (05/2017 echo with LVEF 48%-50%), aortic stenosis (05/2017 echo moderate to severe valvular aortic stenosis, valve area 0.9-1.0 cm2, mean gradient 21 mmHg with somewhat low LVOT VTI, question low flow, low gradient AS), abdominal aortic aneurysm (ultrasound performed 04/19/2017 showed an infrarenal fusiform aneurysm measuring 5.3 x 4.9 cm, a year ago the aneurysm was 5.1 x 4.6 cm; however in 2014, it was 5.2 x 4.8), hypertension, hyperlipidemia, renal insufficiency.      It was noted in the past when he saw Dr. Maury Llanos in 2014 that Mr. Ingarm did not want to have surgery performed on the abdominal aortic aneurysm.  He preferred to have it treated conservatively.      Paul was seen by Dr. Dela Cruz 04/25/2017, and his blood pressure was a little elevated in this setting at 143/54.  He was on Lisinopril 2.5 mg daily, Imdur 30 mg daily, spironolactone 12.5 mg daily and Toprol-XL 25 mg daily.  Dr. Dela Cruz increased the lisinopril to 5 mg daily.  He also wanted to get an echocardiogram to evaluate the aortic valve.      Mr. Ingram presents today for followup.  His echocardiogram was done 05/10/2017 and showed moderate concentric left ventricular hypertrophy, LVEF estimated at 48%-50%, moderate to severe inferolateral wall hypokinesis.  It was noted the inferolateral wall was not  thinned.  The left atrium was mild to moderately dilated.  The right atrium was mildly dilated.  There was moderate to severe valvular aortic stenosis with a valve area of 0.9-1.0 cm2 and a mean gradient of 21 mmHg with somewhat low LVOT VTI with suspicion for low flow, low gradient aortic stenosis.  Basic metabolic panel was drawn 05/10/2017 that showed a creatinine of 1.45, which is mildly elevated but stable from prior.      Mr. Ingram today denies any chest pain, shortness of breath, syncope.  He has no abdominal discomfort or significant back discomfort or flank pain.  He does note he has some chronic occasional lightheadedness, but this has not changed with the change in lisinopril.  He did fall at the bank the other day, but it was not due to lightheadedness.  Rather it was due to his gait he believes and he was not walking with his cane.  He did suffer a laceration to his right forearm/elbow.      PHYSICAL EXAMINATION:   VITAL SIGNS:  Blood pressure 110/62, pulse 54, weight 88.9 kilograms.  BMI 28.18.   GENERAL:  A 89-year-old gentleman who looks younger than his stated age, no acute distress.   SKIN:  Warm and dry and his skin is thin.  He has an abrasion on his right forearm/wrist that is bandaged.   NECK:  There is a transmitted murmur.   LUNGS:  Clear.   HEART:  Regular S1, S2.  I do appreciate an S4.  There is a 2/6 systolic murmur with radiation to the carotids.   ABDOMEN:  Soft.  Bowel sounds are positive.  He has an enlarged abdominal aorta.  The aneurysm is nontender.   EXTREMITIES:  Warm without pitting edema.   NEUROLOGIC:  Alert and oriented x3.  He has coarse tremors.  He walks using a cane.     Outpatient Encounter Prescriptions as of 5/19/2017   Medication Sig Dispense Refill     lisinopril (PRINIVIL/ZESTRIL) 2.5 MG tablet Take 1 tablet (2.5 mg) by mouth 2 times daily 60 tablet 11     [DISCONTINUED] rosuvastatin (CRESTOR) 40 MG tablet Take 1 tablet (40 mg) by mouth At Bedtime 90 tablet 0      naproxen sodium (ALEVE) 220 MG tablet Take 1 tablet (220 mg) by mouth daily 60 tablet      isosorbide mononitrate (IMDUR) 30 MG 24 hr tablet Take 30 mg by mouth daily       spironolactone (ALDACTONE) 25 MG tablet Take 0.5 tablets (12.5 mg) by mouth daily 45 tablet 3     carboxymethylcellulose (REFRESH PLUS) 0.5 % SOLN Place 1 drop into both eyes 3 times daily as needed for dry eyes       fish oil-omega-3 fatty acids (FISH OIL) 1000 MG capsule Take 1 capsule by mouth 2 times daily.       primidone (MYSOLINE) 50 MG tablet Take 1 tablet by mouth 2 times daily.       metoprolol (TOPROL-XL) 25 MG 24 hr tablet Take 25 mg by mouth daily.       nitroglycerin (NITROSTAT) 0.4 MG SL tablet Place 1 tablet under the tongue every 5 minutes as needed.       [DISCONTINUED] lisinopril (PRINIVIL/ZESTRIL) 2.5 MG tablet Take 2 tablets (5 mg) by mouth daily 30 tablet 11     No facility-administered encounter medications on file as of 5/19/2017.       ASSESSMENT AND PLAN:   1.  Coronary artery disease, status post multiple stents in the past.   2.  Ischemic cardiomyopathy, mild.     a.  EF 48%-50%.   3.  Aortic stenosis, moderately severe.     a.  Question low flow, low gradient aortic stenosis (valve area 0.9-1.0 cm2 and a mean gradient of 21 mmHg with somewhat low LVOT VTI) on the recent echo 05/10/2017.   4.  Abdominal aortic aneurysm.   a.  04/19/2017 ultrasound showed an infrarenal fusiform aneurysm measuring 5.3 x 4.9.   b.  03/29/2016 ultrasound showed aneurysm was 5.1 x 4.6.   c.  2014 ultrasound showed aneurysm measured 5.2 x 4.8.   d.  Mr. Ingram had previously [facility] and he did not want to have any surgery done and preferred conservative management.   5.  Hypertension, controlled.   6.  Hyperlipidemia, controlled.   7.  Renal insufficiency, stable.      PLAN:   1.  Mr. Ingram has responded well to the increase in lisinopril with blood pressures much better controlled given his known abdominal  aortic aneurysm.  I do not want his blood pressures to get too low with his moderate to severe aortic stenosis, so we will continue the current dose.  However, I will separate it to 2.5 mg twice daily dosing.   2.  Continue statin and Imdur for his known coronary disease.  It looks like he is unable to tolerate aspirin.   3.  Continue beta blocker and ACE inhibitor, spironolactone for his known cardiomyopathy.   4.  He seems asymptomatic with regard to his aortic stenosis.  We will repeat an echocardiogram in 6 months when he sees Dr. Dela Cruz.  He chose conservative management for treatment of his known abdominal aortic aneurysm.  I have not discussed with him specifically whether or not he would be interested in an option such as TAVR.  I will defer this to Dr. Dela Cruz.   5.  Follow up with Dr. Dela Cruz in 10/2017 with the echo, BMP and fasting lipid panel/ALT.      Thank you very much for allowing me to participate in the care of Paul Ingram.     Sincerely,    Brenda Johnson PA-C     [facility]

## 2017-05-22 NOTE — PROGRESS NOTES
HISTORY OF PRESENT ILLNESS:  I had the pleasure of meeting Paul Ingram today in the Cardiology Clinic for followup of his hypertension, coronary artery disease, aortic stenosis.  Paul is a patient of Dr. Dela Cruz.      Paul is a very pleasant 89-year-old gentleman who appears very well for his age who has a history of coronary artery disease with multiple stents placed in the past, ischemic cardiomyopathy (05/2017 echo with LVEF 48%-50%), aortic stenosis (05/2017 echo moderate to severe valvular aortic stenosis, valve area 0.9-1.0 cm2, mean gradient 21 mmHg with somewhat low LVOT VTI, question low flow, low gradient AS), abdominal aortic aneurysm (ultrasound performed 04/19/2017 showed an infrarenal fusiform aneurysm measuring 5.3 x 4.9 cm, a year ago the aneurysm was 5.1 x 4.6 cm; however in 2014, it was 5.2 x 4.8), hypertension, hyperlipidemia, renal insufficiency.      It was noted in the past when he saw Dr. Maury Llanos in 2014 that Mr. Ingram did not want to have surgery performed on the abdominal aortic aneurysm.  He preferred to have it treated conservatively.      Paul was seen by Dr. Dela Cruz 04/25/2017, and his blood pressure was a little elevated in this setting at 143/54.  He was on Lisinopril 2.5 mg daily, Imdur 30 mg daily, spironolactone 12.5 mg daily and Toprol-XL 25 mg daily.  Dr. Dela Cruz increased the lisinopril to 5 mg daily.  He also wanted to get an echocardiogram to evaluate the aortic valve.      Mr. Ingram presents today for followup.  His echocardiogram was done 05/10/2017 and showed moderate concentric left ventricular hypertrophy, LVEF estimated at 48%-50%, moderate to severe inferolateral wall hypokinesis.  It was noted the inferolateral wall was not thinned.  The left atrium was mild to moderately dilated.  The right atrium was mildly dilated.  There was moderate to severe valvular aortic stenosis with a valve area of 0.9-1.0 cm2 and a mean gradient of 21  mmHg with somewhat low LVOT VTI with suspicion for low flow, low gradient aortic stenosis.  Basic metabolic panel was drawn 05/10/2017 that showed a creatinine of 1.45, which is mildly elevated but stable from prior.      Mr. Ingram today denies any chest pain, shortness of breath, syncope.  He has no abdominal discomfort or significant back discomfort or flank pain.  He does note he has some chronic occasional lightheadedness, but this has not changed with the change in lisinopril.  He did fall at the bank the other day, but it was not due to lightheadedness.  Rather it was due to his gait he believes and he was not walking with his cane.  He did suffer a laceration to his right forearm/elbow.      PHYSICAL EXAMINATION:   VITAL SIGNS:  Blood pressure 110/62, pulse 54, weight 88.9 kilograms.  BMI 28.18.   GENERAL:  A 89-year-old gentleman who looks younger than his stated age, no acute distress.   SKIN:  Warm and dry and his skin is thin.  He has an abrasion on his right forearm/wrist that is bandaged.   NECK:  There is a transmitted murmur.   LUNGS:  Clear.   HEART:  Regular S1, S2.  I do appreciate an S4.  There is a 2/6 systolic murmur with radiation to the carotids.   ABDOMEN:  Soft.  Bowel sounds are positive.  He has an enlarged abdominal aorta.  The aneurysm is nontender.   EXTREMITIES:  Warm without pitting edema.   NEUROLOGIC:  Alert and oriented x3.  He has coarse tremors.  He walks using a cane.      ASSESSMENT AND PLAN:   1.  Coronary artery disease, status post multiple stents in the past.   2.  Ischemic cardiomyopathy, mild.     a.  EF 48%-50%.   3.  Aortic stenosis, moderately severe.     a.  Question low flow, low gradient aortic stenosis (valve area 0.9-1.0 cm2 and a mean gradient of 21 mmHg with somewhat low LVOT VTI) on the recent echo 05/10/2017.   4.  Abdominal aortic aneurysm.   a.  04/19/2017 ultrasound showed an infrarenal fusiform aneurysm measuring 5.3 x 4.9.   b.  03/29/2016 ultrasound  showed aneurysm was 5.1 x 4.6.   c.   ultrasound showed aneurysm measured 5.2 x 4.8.   d.  Mr. Fuller had previously seen Dr. Llanos [facility] in  and he did not want to have any surgery done and preferred conservative management.   5.  Hypertension, controlled.   6.  Hyperlipidemia, controlled.   7.  Renal insufficiency, stable.      PLAN:   1.  Mr. Fuller has responded well to the increase in lisinopril with blood pressures much better controlled given his known abdominal aortic aneurysm.  I do not want his blood pressures to get too low with his moderate to severe aortic stenosis, so we will continue the current dose.  However, I will separate it to 2.5 mg twice daily dosing.   2.  Continue statin and Imdur for his known coronary disease.  It looks like he is unable to tolerate aspirin.   3.  Continue beta blocker and ACE inhibitor, spironolactone for his known cardiomyopathy.   4.  He seems asymptomatic with regard to his aortic stenosis.  We will repeat an echocardiogram in 6 months when he sees Dr. Dela Cruz.  He chose conservative management for treatment of his known abdominal aortic aneurysm.  I have not discussed with him specifically whether or not he would be interested in an option such as TAVR.  I will defer this to Dr. Dela Cruz.   5.  Follow up with Dr. Dela Cruz in 10/2017 with the echo, BMP and fasting lipid panel/ALT.      Thank you very much for allowing me to participate in the care of Paul Fuller.         LUI TUCKER PA-C             D: 2017 17:17   T: 2017 08:40   MT: donald      Name:     PAUL FULLER   MRN:      [id number]        Account:      [id number]   :      10/03/1918           Service Date: 2017      Document: [id number]

## 2017-07-05 DIAGNOSIS — E78.2 MIXED HYPERLIPIDEMIA: ICD-10-CM

## 2017-07-05 RX ORDER — ROSUVASTATIN CALCIUM 40 MG/1
40 TABLET, COATED ORAL AT BEDTIME
Qty: 90 TABLET | Refills: 1 | Status: SHIPPED | OUTPATIENT
Start: 2017-07-05 | End: 2018-02-07

## 2017-07-10 ENCOUNTER — APPOINTMENT (OUTPATIENT)
Dept: CT IMAGING | Facility: CLINIC | Age: 82
End: 2017-07-10
Attending: EMERGENCY MEDICINE
Payer: MEDICARE

## 2017-07-10 ENCOUNTER — HOSPITAL ENCOUNTER (EMERGENCY)
Facility: CLINIC | Age: 82
Discharge: LEFT BEFORE DISCHARGE | End: 2017-07-10
Attending: EMERGENCY MEDICINE | Admitting: EMERGENCY MEDICINE
Payer: MEDICARE

## 2017-07-10 VITALS
DIASTOLIC BLOOD PRESSURE: 97 MMHG | RESPIRATION RATE: 16 BRPM | HEART RATE: 58 BPM | OXYGEN SATURATION: 99 % | SYSTOLIC BLOOD PRESSURE: 156 MMHG | TEMPERATURE: 97.8 F

## 2017-07-10 DIAGNOSIS — R42 VERTIGO: ICD-10-CM

## 2017-07-10 LAB
ANION GAP SERPL CALCULATED.3IONS-SCNC: 8 MMOL/L (ref 3–14)
BASOPHILS # BLD AUTO: 0 10E9/L (ref 0–0.2)
BASOPHILS NFR BLD AUTO: 0.3 %
BUN SERPL-MCNC: 20 MG/DL (ref 7–30)
CALCIUM SERPL-MCNC: 9 MG/DL (ref 8.5–10.1)
CHLORIDE SERPL-SCNC: 107 MMOL/L (ref 94–109)
CO2 SERPL-SCNC: 27 MMOL/L (ref 20–32)
CREAT SERPL-MCNC: 1.31 MG/DL (ref 0.66–1.25)
DIFFERENTIAL METHOD BLD: NORMAL
EOSINOPHIL # BLD AUTO: 0.1 10E9/L (ref 0–0.7)
EOSINOPHIL NFR BLD AUTO: 1.8 %
ERYTHROCYTE [DISTWIDTH] IN BLOOD BY AUTOMATED COUNT: 12.3 % (ref 10–15)
GFR SERPL CREATININE-BSD FRML MDRD: 50 ML/MIN/1.7M2
GLUCOSE SERPL-MCNC: 133 MG/DL (ref 70–99)
HCT VFR BLD AUTO: 42.1 % (ref 40–53)
HGB BLD-MCNC: 13.6 G/DL (ref 13.3–17.7)
IMM GRANULOCYTES # BLD: 0 10E9/L (ref 0–0.4)
IMM GRANULOCYTES NFR BLD: 0.3 %
LYMPHOCYTES # BLD AUTO: 1.5 10E9/L (ref 0.8–5.3)
LYMPHOCYTES NFR BLD AUTO: 21.6 %
MCH RBC QN AUTO: 30.8 PG (ref 26.5–33)
MCHC RBC AUTO-ENTMCNC: 32.3 G/DL (ref 31.5–36.5)
MCV RBC AUTO: 95 FL (ref 78–100)
MONOCYTES # BLD AUTO: 0.4 10E9/L (ref 0–1.3)
MONOCYTES NFR BLD AUTO: 5.6 %
NEUTROPHILS # BLD AUTO: 4.8 10E9/L (ref 1.6–8.3)
NEUTROPHILS NFR BLD AUTO: 70.4 %
NRBC # BLD AUTO: 0 10*3/UL
NRBC BLD AUTO-RTO: 0 /100
PLATELET # BLD AUTO: 173 10E9/L (ref 150–450)
POTASSIUM SERPL-SCNC: 3.9 MMOL/L (ref 3.4–5.3)
RBC # BLD AUTO: 4.42 10E12/L (ref 4.4–5.9)
SODIUM SERPL-SCNC: 142 MMOL/L (ref 133–144)
TROPONIN I SERPL-MCNC: NORMAL UG/L (ref 0–0.04)
WBC # BLD AUTO: 6.8 10E9/L (ref 4–11)

## 2017-07-10 PROCEDURE — 96360 HYDRATION IV INFUSION INIT: CPT

## 2017-07-10 PROCEDURE — 84484 ASSAY OF TROPONIN QUANT: CPT | Performed by: EMERGENCY MEDICINE

## 2017-07-10 PROCEDURE — 70450 CT HEAD/BRAIN W/O DYE: CPT

## 2017-07-10 PROCEDURE — 80048 BASIC METABOLIC PNL TOTAL CA: CPT | Performed by: EMERGENCY MEDICINE

## 2017-07-10 PROCEDURE — 25000131 ZZH RX MED GY IP 250 OP 636 PS 637: Mod: GY | Performed by: EMERGENCY MEDICINE

## 2017-07-10 PROCEDURE — 93005 ELECTROCARDIOGRAM TRACING: CPT

## 2017-07-10 PROCEDURE — 96361 HYDRATE IV INFUSION ADD-ON: CPT

## 2017-07-10 PROCEDURE — 99285 EMERGENCY DEPT VISIT HI MDM: CPT | Mod: 25

## 2017-07-10 PROCEDURE — A9270 NON-COVERED ITEM OR SERVICE: HCPCS | Mod: GY | Performed by: EMERGENCY MEDICINE

## 2017-07-10 PROCEDURE — 25000128 H RX IP 250 OP 636: Performed by: EMERGENCY MEDICINE

## 2017-07-10 PROCEDURE — 85025 COMPLETE CBC W/AUTO DIFF WBC: CPT | Performed by: EMERGENCY MEDICINE

## 2017-07-10 RX ORDER — MECLIZINE HCL 12.5 MG 12.5 MG/1
12.5 TABLET ORAL 4 TIMES DAILY PRN
Qty: 30 TABLET | Refills: 0 | Status: SHIPPED | OUTPATIENT
Start: 2017-07-10 | End: 2017-12-28

## 2017-07-10 RX ORDER — SODIUM CHLORIDE 9 MG/ML
1000 INJECTION, SOLUTION INTRAVENOUS CONTINUOUS
Status: DISCONTINUED | OUTPATIENT
Start: 2017-07-10 | End: 2017-07-10 | Stop reason: HOSPADM

## 2017-07-10 RX ORDER — LIDOCAINE 40 MG/G
CREAM TOPICAL
Status: DISCONTINUED | OUTPATIENT
Start: 2017-07-10 | End: 2017-07-10 | Stop reason: HOSPADM

## 2017-07-10 RX ORDER — MECLIZINE HYDROCHLORIDE 25 MG/1
25 TABLET ORAL ONCE
Status: COMPLETED | OUTPATIENT
Start: 2017-07-10 | End: 2017-07-10

## 2017-07-10 RX ADMIN — SODIUM CHLORIDE 1000 ML: 9 INJECTION, SOLUTION INTRAVENOUS at 16:19

## 2017-07-10 RX ADMIN — MECLIZINE HYDROCHLORIDE 25 MG: 25 TABLET ORAL at 17:18

## 2017-07-10 RX ADMIN — SODIUM CHLORIDE 250 ML: 9 INJECTION, SOLUTION INTRAVENOUS at 15:25

## 2017-07-10 ASSESSMENT — ENCOUNTER SYMPTOMS
DIZZINESS: 0
WEAKNESS: 0
ABDOMINAL PAIN: 0
LIGHT-HEADEDNESS: 1
FATIGUE: 0
SHORTNESS OF BREATH: 0
COUGH: 0
FEVER: 0
SPEECH DIFFICULTY: 0

## 2017-07-10 NOTE — ED PROVIDER NOTES
History     Chief Complaint:  Dizziness and dehydration    HPI   Paul Ingram is a 89 year old male who presents with lightheadedness and dehydration. The patient states that 2 days ago he started feeling lightheaded and nauseas. The symptoms prevented him from sleeping well Albin night. The patient states that turning his head doesn't make his symptoms work but states when he walks he needs to hold onto something due to light headedness. He denies any headaches, chest pain, abdominal pain, cough, or fevers.    Allergies:  Aspirin  Atorvastatin  Penicillins  Sulfadimidine      Medications:    rosuvastatin (CRESTOR) 40 MG tablet  lisinopril (PRINIVIL/ZESTRIL) 2.5 MG tablet  naproxen sodium (ALEVE) 220 MG tablet  isosorbide mononitrate (IMDUR) 30 MG 24 hr tablet  spironolactone (ALDACTONE) 25 MG tablet  carboxymethylcellulose (REFRESH PLUS) 0.5 % SOLN  fish oil-omega-3 fatty acids (FISH OIL) 1000 MG capsule  primidone (MYSOLINE) 50 MG tablet  metoprolol (TOPROL-XL) 25 MG 24 hr tablet  nitroglycerin (NITROSTAT) 0.4 MG SL tablet       Past Medical History:    Abdominal aneurysm  Cardiomyopathy  Chronic kidney disease  coronary artery disease  Dyspnea   myocardial infarction   Hyperlipidemia  Hypertension  Ischemic cardiomyopathy  Peripheral artery disease  Post herpetic neuralgia  Peptic ulcer disease  pulmonary embolism   RBBB  Seborrheic keratosis      Past Surgical History:    Carotid endarterectomy  Coronary artery bypass    Family History:    Mother-cancer, coronary artery disease  Father-Diabetes  Brother-Cancer  Sister-coronary artery disease    Social History:  Smoking status: Former smoker  Alcohol use: Yes  Marital Status:        Review of Systems   Constitutional: Negative for fatigue and fever.   Respiratory: Negative for cough and shortness of breath.    Cardiovascular: Negative for chest pain.   Gastrointestinal: Negative for abdominal pain.   Neurological: Positive for light-headedness.  Negative for dizziness, speech difficulty and weakness.   All other systems reviewed and are negative.      Physical Exam     Patient Vitals for the past 24 hrs:   BP Temp Temp src Pulse Heart Rate Resp SpO2   07/10/17 1630 - - - - 61 - 93 %   07/10/17 1600 - - - - 63 - 94 %   07/10/17 1545 - - - - 61 - 97 %   07/10/17 1515 159/81 97.8  F (36.6  C) Oral 58 58 16 96 %       Physical Exam   Constitutional: He is oriented to person, place, and time. He appears well-developed.   HENT:   Head: Normocephalic and atraumatic.   Right Ear: External ear normal.   Mouth/Throat: Oropharynx is clear and moist.   Eyes: Conjunctivae and EOM are normal. Pupils are equal, round, and reactive to light.   Neck: Normal range of motion. Neck supple. No JVD present.   Cardiovascular: Normal rate, regular rhythm and normal heart sounds.    Pulmonary/Chest: Effort normal and breath sounds normal.   Abdominal: Soft. Bowel sounds are normal. He exhibits no distension. There is no tenderness. There is no rebound.   Musculoskeletal: Normal range of motion.   Lymphadenopathy:     He has no cervical adenopathy.   Neurological: He is alert and oriented to person, place, and time. He displays normal reflexes. No cranial nerve deficit. He exhibits normal muscle tone. He displays a negative Romberg sign. Gait abnormal. Coordination normal. GCS eye subscore is 4. GCS verbal subscore is 5. GCS motor subscore is 6.   Pt unsteady with walking, requiring assist, or walker.   Skin: Skin is warm and dry.   Psychiatric: He has a normal mood and affect. His behavior is normal. Judgment normal.   Nursing note and vitals reviewed.      Emergency Department Course   ECG:  @ 1512  Indication: Dizziness  Vent. Rate 64 bpm. IA interval 208 ms. QRS duration 156 ms. QT/QTc 442/455 ms. P-R-T axis 16 -29 8.   Normal sinus rhythm, Right bundle branch block. Inferior infarct, age undermined. Anterolateral infarction age undermined.  Abnormal ECG.  No significant change  when compared to previous ECG from 7/10/17   Read @ 1545 by Dr. Loredo.    Laboratory:  CBC:  WBC 6.8, HGB 13.6,  WNL  BMP: Glucose 133(H), Creatinine 1.31(H) GFR 50(L), otherwise WNL  (1517) Troponin I: <0.015    Interventions:  (1504) Normal Saline, 1 liter, IV bolus  (1525) Normal Saline, 1 liter, IV bolus    Emergency Department Course:  Nursing notes and vitals reviewed.  (1504) I performed an exam of the patient as documented above.    Blood was drawn from the patient. This was sent for laboratory testing, findings above.   EKG was done, interpretation as above.    DECISION PENDING RESULTS  Impression & Plan    Medical Decision Making:  Paul Ingram is a 89 year old male who presents with dizziness described as imbalance when walking. Patient is well appearing and is at risk for stroke due to history of vascular disease and prior stroke. Patient's NH Stroke scale is 0 however patient feels unsteady when he walks. Patients lab test and EKG are unremarkable. Patient was given some meclozine but still is unsteady and seems to lean to one side when walking though patient's finger to nose and heel to shin test are unremarkable. Patient does have a baseline tremor but does have a brain implanted stimulator for this condition. We will order non contrast head CT, this is pending./ patient will be signed out to Dr. Nelson pending this result. I did discuss with the patient the lack of sensitivity of CT scanning for posterior circulation stroke and if persistently dizzy and unsteady with walking, will consider MRI of the brain. This will have to happen at [facility] due to the requirements and limitations of his implantable brain stimulator. Decision and MRI and transfer will be left up to Dr. Nelson pending results of his head CT and interrogation of his brain implant device from [facility]. Provisional diagnosis: 1 dizziness 2 history of periphoria disease 3 rule out  stroke.    Diagnosis:  PENDING RESULTS    Disposition:  PATIENT SIGNED OUT TO ONCOMING PHYSICIAN Dr. dev Tinajero  7/10/2017   [facility] EMERGENCY DEPARTMENT    I, Blue Tinajero, am serving as a scribe on 7/10/2017 at 3:04 PM to personally document services performed by Dr. Loredo based on my observations and the provider's statements to me.       Jimmy Loredo MD  07/14/17 2008

## 2017-07-10 NOTE — ED NOTES
Pt arrives to ED with complaints of dizziness since Saturday night. Pt went to clinic today and they did blood work, told him he was dehydrated, and sent him here for treatment. A&Ox3. ABCs intact.

## 2017-07-10 NOTE — ED AVS SNAPSHOT
[facility] Emergency Department    [address]    [facility] [address]    Phone:  [phone]    Fax:  [phone]                                       Paul Ingram   MRN: [id number]    Department:  [facility] Emergency Department   Date of Visit:  7/10/2017           After Visit Summary Signature Page     I have received my discharge instructions, and my questions have been answered. I have discussed any challenges I see with this plan with the nurse or doctor.    ..........................................................................................................................................  Patient/Patient Representative Signature      ..........................................................................................................................................  Patient Representative Print Name and Relationship to Patient    ..................................................               ................................................  Date                                            Time    ..........................................................................................................................................  Reviewed by Signature/Title    ...................................................              ..............................................  Date                                                            Time

## 2017-07-10 NOTE — ED NOTES
Pt ambulated to bathroom at this time. Pt stated he felt dizzy the whole time but was able to ambulate with walker without RN assistance. Pt able to toilet independently.

## 2017-07-10 NOTE — ED AVS SNAPSHOT
[facility]    [address] [facility] [address]    Phone:  [phone]    Fax:  [phone]                                       Paul Ingram   MRN: [id number]    Department:  [facility]   Date of Visit:  7/10/2017           Patient Information     Date Of Birth          10/3/1918        Your diagnoses for this visit were:     Vertigo        You were seen by Jimmy Loredo MD and Lauri Nelson MD.      Follow-up Information     Follow up with [facility], [facility] In 2 days.    Contact information:    [address] [place] [address]  [phone]          Discharge Instructions         Discharge Instructions  Vertigo  You have been diagnosed with vertigo.  This is a feeling that you are spinning or that the room is moving around you. You will often have nausea, vomiting, and balance problems with it.  Vertigo is usually caused by a problem in the inner ear which helps control your balance.  Many things can cause vertigo, including calcium collections in the inner ear, a virus infection of the inner ear, concussion, migraine, and some medicines.  Luckily, these causes are not life threatening and will eventually go away.  However, sometimes there is a serious problem that does not show up right away.  Return to the Emergency Department if you have:    New or severe headache.    Temperature greater than 100.4 F (38 C).    Seeing double or having trouble seeing clearly.    Trouble speaking or hearing.    Weakness in an arm or leg.    Passing out.    Numbness or tingling.    Chest pain.    Vomiting that will not stop.    Treatment:    An antihistamine, such as Antivert  (meclizine), or non-prescription medicines like Dramamine  (dimenhydrinate), or Benadryl  (diphenhydramine).    Prescription anti-nausea medicines, such as Phenergan  (promethazine), Reglan  (metoclopramide), or Zofran   (ondansetron).    Prescription sedative medicines, such as Valium  (diazepam), Ativan  (lorazepam), or Klonopin  (clonazepam).    Most of these medicines make you sleepy, and you should not take them before you work or drive. You should only take prescription medicines to treat severe vertigo symptoms, and you should stop the medicine when your symptoms improve.    Follow Up:    If you have vertigo longer than three days, it is important that you follow up either with your primary doctor or an Ear Nose and Throat doctor.  You may need further testing to evaluate your vertigo and you may also need  vestibular  therapy which is a special form of physical therapy to make the vertigo go away.    If you were given a prescription for medicine here today, be sure to read all of the information (including the package insert) that comes with your prescription.  This will include important information about the medicine, its side effects, and any warnings that you need to know about.  The pharmacist who fills the prescription can provide more information and answer questions you may have about the medicine.  If you have questions or concerns that the pharmacist cannot address, please call or return to the Emergency Department.     Remember that you can always come back to the Emergency Department if you are not able to see your regular doctor in the amount of time listed above, if you get any new symptoms, or if there is anything that worries you.          Future Appointments        Provider Department Dept Phone Center    12/15/2017 12:50 PM Manjeet Bragg MD [facility] Neurology [phone] [facility]      24 Hour Appointment Hotline       To make an appointment at any [facility], call [phone] ([phone]). If you don't have a family doctor or clinic, we will help you find one. [place] clinics are conveniently located to serve the needs of you and your family.          ED Discharge Orders     MRI Brain w &  w/o contrast       Administration of IV contrast (contrast agent, dose, and amount) will be tailored to this examination per the appropriate written protocol listed in the Protocol E-Book, or by the supervising imaging provider.                     Review of your medicines      START taking        Dose / Directions Last dose taken    meclizine 12.5 MG tablet   Commonly known as:  ANTIVERT   Dose:  12.5 mg   Quantity:  30 tablet        Take 1 tablet (12.5 mg) by mouth 4 times daily as needed for dizziness   Refills:  0          Our records show that you are taking the medicines listed below. If these are incorrect, please call your family doctor or clinic.        Dose / Directions Last dose taken    ALEVE 220 MG tablet   Dose:  220 mg   Quantity:  60 tablet   Generic drug:  naproxen sodium        Take 1 tablet (220 mg) by mouth daily   Refills:  0        carboxymethylcellulose 0.5 % Soln ophthalmic solution   Commonly known as:  REFRESH PLUS   Dose:  1 drop        Place 1 drop into both eyes 3 times daily as needed for dry eyes   Refills:  0        fish oil-omega-3 fatty acids 1000 MG capsule   Dose:  1 capsule        Take 1 capsule by mouth 2 times daily.   Refills:  0        isosorbide mononitrate 30 MG 24 hr tablet   Commonly known as:  IMDUR   Dose:  30 mg        Take 30 mg by mouth daily   Refills:  0        lisinopril 2.5 MG tablet   Commonly known as:  PRINIVIL/Zestril   Dose:  2.5 mg   Quantity:  60 tablet        Take 1 tablet (2.5 mg) by mouth 2 times daily   Refills:  11        metoprolol 25 MG 24 hr tablet   Commonly known as:  TOPROL-XL   Dose:  25 mg        Take 25 mg by mouth daily.   Refills:  0        NITROSTAT 0.4 MG sublingual tablet   Dose:  1 tablet   Generic drug:  nitroGLYcerin        Place 1 tablet under the tongue every 5 minutes as needed.   Refills:  0        primidone 50 MG tablet   Commonly known as:  MYSOLINE   Dose:  1 tablet        Take 1 tablet by mouth 2 times daily.   Refills:  0         rosuvastatin 40 MG tablet   Commonly known as:  CRESTOR   Dose:  40 mg   Quantity:  90 tablet        Take 1 tablet (40 mg) by mouth At Bedtime   Refills:  1        spironolactone 25 MG tablet   Commonly known as:  ALDACTONE   Dose:  12.5 mg   Quantity:  45 tablet        Take 0.5 tablets (12.5 mg) by mouth daily   Refills:  3                Prescriptions were sent or printed at these locations (1 Prescription)                   Other Prescriptions                Printed at Department/Unit printer (1 of 1)         meclizine (ANTIVERT) 12.5 MG tablet                Procedures and tests performed during your visit     Activity: Ambulate with assist    Basic metabolic panel    CBC with platelets differential    Cardiac Continuous Monitoring    EKG 12-lead, tracing only    Head CT w/o contrast    Orthostatic blood pressure and pulse    Peripheral IV: Standard    Pulse oximetry nursing    Troponin I      Orders Needing Specimen Collection     None      Pending Results     Date and Time Order Name Status Description    7/10/2017 1700 Head CT w/o contrast Preliminary     7/10/2017 1508 EKG 12-lead, tracing only Preliminary             Pending Culture Results     No orders found from 7/8/2017 to 7/11/2017.            Pending Results Instructions     If you had any lab results that were not finalized at the time of your Discharge, you can call the ED Lab Result RN at [phone]. You will be contacted by this team for any positive Lab results or changes in treatment. The nurses are available 7 days a week from 10A to 6:30P.  You can leave a message 24 hours per day and they will return your call.        Test Results From Your Hospital Stay        7/10/2017  3:26 PM      Component Results     Component Value Ref Range & Units Status    WBC 6.8 4.0 - 11.0 10e9/L Final    RBC Count 4.42 4.4 - 5.9 10e12/L Final    Hemoglobin 13.6 13.3 - 17.7 g/dL Final    Hematocrit 42.1 40.0 - 53.0 % Final    MCV 95 78 - 100 fl Final    MCH  30.8 26.5 - 33.0 pg Final    MCHC 32.3 31.5 - 36.5 g/dL Final    RDW 12.3 10.0 - 15.0 % Final    Platelet Count 173 150 - 450 10e9/L Final    Diff Method Automated Method  Final    % Neutrophils 70.4 % Final    % Lymphocytes 21.6 % Final    % Monocytes 5.6 % Final    % Eosinophils 1.8 % Final    % Basophils 0.3 % Final    % Immature Granulocytes 0.3 % Final    Nucleated RBCs 0 0 /100 Final    Absolute Neutrophil 4.8 1.6 - 8.3 10e9/L Final    Absolute Lymphocytes 1.5 0.8 - 5.3 10e9/L Final    Absolute Monocytes 0.4 0.0 - 1.3 10e9/L Final    Absolute Eosinophils 0.1 0.0 - 0.7 10e9/L Final    Absolute Basophils 0.0 0.0 - 0.2 10e9/L Final    Abs Immature Granulocytes 0.0 0 - 0.4 10e9/L Final    Absolute Nucleated RBC 0.0  Final         7/10/2017  4:00 PM      Component Results     Component Value Ref Range & Units Status    Sodium 142 133 - 144 mmol/L Final    Potassium 3.9 3.4 - 5.3 mmol/L Final    Chloride 107 94 - 109 mmol/L Final    Carbon Dioxide 27 20 - 32 mmol/L Final    Anion Gap 8 3 - 14 mmol/L Final    Glucose 133 (H) 70 - 99 mg/dL Final    Urea Nitrogen 20 7 - 30 mg/dL Final    Creatinine 1.31 (H) 0.66 - 1.25 mg/dL Final    GFR Estimate 50 (L) >60 mL/min/1.7m2 Final    Non  GFR Calc    GFR Estimate If Black 61 >60 mL/min/1.7m2 Final    African American GFR Calc    Calcium 9.0 8.5 - 10.1 mg/dL Final         7/10/2017  4:00 PM      Component Results     Component Value Ref Range & Units Status    Troponin I ES  0.000 - 0.045 ug/L Final    <0.015  The 99th percentile for upper reference range is 0.045 ug/L.  Troponin values in   the range of 0.045 - 0.120 ug/L may be associated with risks of adverse   clinical events.           7/10/2017  6:29 PM      Narrative     CT OF THE HEAD WITHOUT CONTRAST 7/10/2017 6:27 PM     COMPARISON: Head CT 12/11/2015.     HISTORY: Dizziness.    TECHNIQUE: 5 mm thick axial CT images of the head were acquired  without IV contrast material.    FINDINGS: A deep brain  stimulator placed through a left frontal  approach with its tip near the inferior aspect of the left thalamus is  again noted without change. There is moderate diffuse cerebral volume  loss. There are subtle patchy areas of decreased density in the  cerebral white matter bilaterally that are consistent with sequela of  chronic small vessel ischemic disease.    The ventricles and basal cisterns are within normal limits in  configuration given the degree of cerebral volume loss.  There is no  midline shift. There are no extra-axial fluid collections.    No intracranial hemorrhage, mass or recent infarct.    The visualized paranasal sinuses are well-aerated. There is no  mastoiditis. There are no fractures of the visualized bones.        Impression     IMPRESSION: Left deep brain stimulator again noted. Diffuse cerebral  volume loss and cerebral white matter changes consistent with chronic  small vessel ischemic disease. No evidence for acute intracranial  pathology.    Radiation dose for this scan was reduced using automated exposure  control, adjustment of the mA and/or kV according to patient size, or  iterative reconstruction technique.                  Clinical Quality Measure: Blood Pressure Screening     Your blood pressure was checked while you were in the emergency department today. The last reading we obtained was  BP: (!) 174/119 . Please read the guidelines below about what these numbers mean and what you should do about them.  If your systolic blood pressure (the top number) is less than 120 and your diastolic blood pressure (the bottom number) is less than 80, then your blood pressure is normal. There is nothing more that you need to do about it.  If your systolic blood pressure (the top number) is 120-139 or your diastolic blood pressure (the bottom number) is 80-89, your blood pressure may be higher than it should be. You should have your blood pressure rechecked within a year by a primary care  "provider.  If your systolic blood pressure (the top number) is 140 or greater or your diastolic blood pressure (the bottom number) is 90 or greater, you may have high blood pressure. High blood pressure is treatable, but if left untreated over time it can put you at risk for heart attack, stroke, or kidney failure. You should have your blood pressure rechecked by a primary care provider within the next 4 weeks.  If your provider in the emergency department today gave you specific instructions to follow-up with your doctor or provider even sooner than that, you should follow that instruction and not wait for up to 4 weeks for your follow-up visit.        Thank you for choosing [facility]       Thank you for choosing [facility] for your care. Our goal is always to provide you with excellent care. Hearing back from our patients is one way we can continue to improve our services. Please take a few minutes to complete the written survey that you may receive in the mail after you visit with us. Thank you!        Ocutechart Information     [employer] lets you send messages to your doctor, view your test results, renew your prescriptions, schedule appointments and more. To sign up, go to [email or web address] . Click on \"Log in\" on the left side of the screen, which will take you to the Welcome page. Then click on \"Sign up Now\" on the right side of the page.     You will be asked to enter the access code listed below, as well as some personal information. Please follow the directions to create your username and password.     Your access code is: [email or web address]  Expires: 2017  3:17 PM     Your access code will  in 90 days. If you need help or a new code, please call your [facility] clinic or [phone].        Care EveryWhere ID     This is your Care EveryWhere ID. This could be used by other organizations to access your [facility] medical records  [id number]        Equal Access to Services     RADHAMES HILLMAN: Alva cochran" ashley Bland, daniela machado, milka joaquinmanati basilio, suzy diaz. So [facility] [phone].    ATENCIÓN: Si habla español, tiene a jansen disposición servicios gratuitos de asistencia lingüística. Llame al [phone].    We comply with applicable federal civil rights laws and [place] laws. We do not discriminate on the basis of race, color, national origin, age, disability sex, sexual orientation or gender identity.            After Visit Summary       This is your record. Keep this with you and show to your community pharmacist(s) and doctor(s) at your next visit.

## 2017-07-11 LAB — INTERPRETATION ECG - MUSE: NORMAL

## 2017-07-11 NOTE — ED NOTES
Pt discharged home with daughter. Verbal and written instructions given and explained. All questions answered.

## 2017-07-11 NOTE — DISCHARGE INSTRUCTIONS
Discharge Instructions  Vertigo  You have been diagnosed with vertigo.  This is a feeling that you are spinning or that the room is moving around you. You will often have nausea, vomiting, and balance problems with it.  Vertigo is usually caused by a problem in the inner ear which helps control your balance.  Many things can cause vertigo, including calcium collections in the inner ear, a virus infection of the inner ear, concussion, migraine, and some medicines.  Luckily, these causes are not life threatening and will eventually go away.  However, sometimes there is a serious problem that does not show up right away.  Return to the Emergency Department if you have:    New or severe headache.    Temperature greater than 100.4 F (38 C).    Seeing double or having trouble seeing clearly.    Trouble speaking or hearing.    Weakness in an arm or leg.    Passing out.    Numbness or tingling.    Chest pain.    Vomiting that will not stop.    Treatment:    An antihistamine, such as Antivert  (meclizine), or non-prescription medicines like Dramamine  (dimenhydrinate), or Benadryl  (diphenhydramine).    Prescription anti-nausea medicines, such as Phenergan  (promethazine), Reglan  (metoclopramide), or Zofran  (ondansetron).    Prescription sedative medicines, such as Valium  (diazepam), Ativan  (lorazepam), or Klonopin  (clonazepam).    Most of these medicines make you sleepy, and you should not take them before you work or drive. You should only take prescription medicines to treat severe vertigo symptoms, and you should stop the medicine when your symptoms improve.    Follow Up:    If you have vertigo longer than three days, it is important that you follow up either with your primary doctor or an Ear Nose and Throat doctor.  You may need further testing to evaluate your vertigo and you may also need  vestibular  therapy which is a special form of physical therapy to make the vertigo go away.    If you were given a  prescription for medicine here today, be sure to read all of the information (including the package insert) that comes with your prescription.  This will include important information about the medicine, its side effects, and any warnings that you need to know about.  The pharmacist who fills the prescription can provide more information and answer questions you may have about the medicine.  If you have questions or concerns that the pharmacist cannot address, please call or return to the Emergency Department.     Remember that you can always come back to the Emergency Department if you are not able to see your regular doctor in the amount of time listed above, if you get any new symptoms, or if there is anything that worries you.

## 2017-07-11 NOTE — ED NOTES
"ED signout note:    Patient signed up to me by Dr. Loredo at 1700 - shift change.    Pleasant 89-year-old, generally independent, male with a history of vascular disease presents with onset of imbalance and vertigo type dizziness today.    Signed out pending results of noncontrast head CT. With plan that even if CT normal, he would need MRI to rule out cerebellar infarct. Unfortunately he has a deep brain stimulator and therefore cannot have MR here at [facility] because we do not have the proper \"send - receive\" coil. However the proper equipment is itself the hospital. Dr. Loredo's plan is to transfer the patient [facility]. He could go as an ER to ER transfer if his dizziness is improved, but would go as a ER to UNIT transfer if he requires admission for unsteady gait.    Results of the patient's noncontrast CT came back showing no acute finding. Other labs look reassuring.    I reject the patient for meclizine. He says is feeling better. He is able to ambulate under his own power, using a walker. He does not normally use a walker but has been using one for the past couple of days. I discussed with the patient, and his daughter, that recommendation would be for him to go to [facility] for MRI to rule out stroke. They understand the risk of recurrent stroke or worsening overnight if things go undiagnosed and untreated. His daughter agrees with me and would like him to be admitted itself overnight so she can go home and rest, and have the MRI while at [facility].    However the patient does not want to be admitted. He says he feels a lot better. He wants to go home and have the MRI as an outpatient tomorrow. He understands this is not optimal stroke care. He understands this is risky. Nonetheless he's clearly alert and oriented and has decision-making capacity. Therefore will send him home from the [facility]. Prescription for meclizine provided.    I had multiple phone conversations including with the " [employer] tech, Cintia Dickerson ([phone]) who says that his deep brain stimulator would be suitable for MR but only with a sender - receive coil which is only available at [facility] or the .     -Had conversations with the neuronal radiologist, Dr. Kitchen, with Edin the MRI tech [facility], as well as with Tatiana the MRI tech at [facility]. They are aware of our plan to send the patient home (not medical advice). And they will get him in for an MRI tomorrow as an outpatient. They took his phone number and contact information and should call him in the morning.    Precautions for returnable reviewed.    Clinical impression:  1. Vertigo, rule out cerebellar infarct     Lauri Nelson MD  07/10/17 8146

## 2017-07-14 ENCOUNTER — ALLIED HEALTH/NURSE VISIT (OUTPATIENT)
Dept: NEUROLOGY | Facility: CLINIC | Age: 82
End: 2017-07-14

## 2017-07-14 DIAGNOSIS — G25.0 ESSENTIAL TREMOR: Primary | ICD-10-CM

## 2017-07-14 NOTE — MR AVS SNAPSHOT
After Visit Summary   7/14/2017    Paul Ingram    MRN: [id number]           Patient Information     Date Of Birth          10/3/1918        Visit Information        Provider Department      7/14/2017 10:00 AM Nurse, [facility] Neurology        Today's Diagnoses     Essential tremor    -  1       Follow-ups after your visit        Your next 10 appointments already scheduled     Jul 18, 2017  3:00 PM CDT   MR BRAIN W/O & W CONTRAST with UUMR1   [facility], [place], [facility] ([facility], [facility])    [address] [facility] [address] [phone]           Take your medicines as usual, unless your doctor tells you not to. Bring a list of your current medicines to your exam (including vitamins, minerals and over-the-counter drugs).  You will be given intravenous contrast for this exam. To prepare:   The day before your exam, drink extra fluids at least six 8-ounce glasses (unless your doctor tells you to restrict your fluids).   Have a blood test (creatinine test) within 30 days of your exam. Go to your clinic or Diagnostic Imaging Department for this test.  The MRI machine uses a strong magnet. Please wear clothes without metal (snaps, zippers). A sweatsuit works well, or we may give you a hospital gown.  Please remove any body piercings and hair extensions before you arrive. You will also remove watches, jewelry, hairpins, wallets, dentures, partial dental plates and hearing aids. You may wear contact lenses, and you may be able to wear your rings. We have a safe place to keep your personal items, but it is safer to leave them at home.   **IMPORTANT** THE INSTRUCTIONS BELOW ARE ONLY FOR THOSE PATIENTS WHO HAVE BEEN TOLD THEY WILL RECEIVE SEDATION OR GENERAL ANESTHESIA DURING THEIR MRI PROCEDURE:  IF YOU WILL RECEIVE SEDATION (take medicine to help you relax during your exam):   You must get the medicine from your doctor before you arrive.  Bring the medicine to the exam. Do not take it at home.   Arrive one hour early. Bring someone who can take you home after the test. Your medicine will make you sleepy. After the exam, you may not drive, take a bus or take a taxi by yourself.   No eating 8 hours before your exam. You may have clear liquids up until 4 hours before your exam. (Clear liquids include water, clear tea, black coffee and fruit juice without pulp.)  IF YOU WILL RECEIVE ANESTHESIA (be asleep for your exam):   Arrive 1 1/2 hours early. Bring someone who can take you home after the test. You may not drive, take a bus or take a taxi by yourself.   No eating 8 hours before your exam. You may have clear liquids up until 4 hours before your exam. (Clear liquids include water, clear tea, black coffee and fruit juice without pulp.)  Please call the Imaging Department at your exam site with any questions.            Dec 15, 2017 12:50 PM CST   (Arrive by 12:35 PM)   Return Movement Disorder with Manjeet Bragg MD   [facility] Neurology ([facility])    [address] [place] [address] [phone]              Who to contact     Please call your clinic at [phone] to:    Ask questions about your health    Make or cancel appointments    Discuss your medicines    Learn about your test results    Speak to your doctor   If you have compliments or concerns about an experience at your clinic, or if you wish to file a complaint, please contact [facility] Patient Relations at [phone] or email us at [email or web address]         Additional Information About Your Visit        Fragegg Information     Fragegg is an electronic gateway that provides easy, online access to your medical records. With Fragegg, you can request a clinic appointment, read your test results, renew a prescription or communicate with your care team.     To sign up for Fragegg visit the  website at [email or web address]   You will be asked to enter the access code listed below, as well as some personal information. Please follow the directions to create your username and password.     Your access code is: [email or web address]  Expires: 2017  3:17 PM     Your access code will  in 90 days. If you need help or a new code, please contact your [facility] or call [phone] for assistance.        Care EveryWhere ID     This is your Care EveryWhere ID. This could be used by other organizations to access your [place] medical records  [id number]         Blood Pressure from Last 3 Encounters:   07/10/17 (!) 156/97   17 110/62   17 143/54    Weight from Last 3 Encounters:   17 88.9 kg (196 lb)   17 88.5 kg (195 lb)   17 82.2 kg (181 lb 4.8 oz)              Today, you had the following     No orders found for display       Primary Care Provider Office Phone # Fax #    [facility] [phone] [address] [place] [address]        Equal Access to Services     RADHAMES GIBSON AH: Hadii aad ku hadasho Soomaali, waaxda luqadaha, qaybta kaalmada adeegyada, suzy fontanain hayipercen elder diaz. So [facility] [phone].    ATENCIÓN: Si habla español, tiene a jansen disposición servicios gratuitos de asistencia lingüística. Llame al [phone].    We comply with applicable federal civil rights laws and [place] laws. We do not discriminate on the basis of race, color, national origin, age, disability sex, sexual orientation or gender identity.            Thank you!     Thank you for choosing [facility] NEUROLOGY  for your care. Our goal is always to provide you with excellent care. Hearing back from our patients is one way we can continue to improve our services. Please take a few minutes to complete the written survey that you may receive in the mail after your visit with us. Thank you!              Your Updated Medication List - Protect others around you: Learn how to safely use, store and throw away your medicines at www.disposemymeds.org.          This list is accurate as of: 7/14/17 11:31 AM.  Always use your most recent med list.                   Brand Name Dispense Instructions for use Diagnosis    ALEVE 220 MG tablet   Generic drug:  naproxen sodium     60 tablet    Take 1 tablet (220 mg) by mouth daily    Essential tremor       carboxymethylcellulose 0.5 % Soln ophthalmic solution    REFRESH PLUS     Place 1 drop into both eyes 3 times daily as needed for dry eyes        fish oil-omega-3 fatty acids 1000 MG capsule      Take 1 capsule by mouth 2 times daily.        isosorbide mononitrate 30 MG 24 hr tablet    IMDUR     Take 30 mg by mouth daily        lisinopril 2.5 MG tablet    PRINIVIL/Zestril    60 tablet    Take 1 tablet (2.5 mg) by mouth 2 times daily    Essential hypertension       meclizine 12.5 MG tablet    ANTIVERT    30 tablet    Take 1 tablet (12.5 mg) by mouth 4 times daily as needed for dizziness        metoprolol 25 MG 24 hr tablet    TOPROL-XL     Take 25 mg by mouth daily.        NITROSTAT 0.4 MG sublingual tablet   Generic drug:  nitroGLYcerin      Place 1 tablet under the tongue every 5 minutes as needed.        primidone 50 MG tablet    MYSOLINE     Take 1 tablet by mouth 2 times daily.        rosuvastatin 40 MG tablet    CRESTOR    90 tablet    Take 1 tablet (40 mg) by mouth At Bedtime    Mixed hyperlipidemia       spironolactone 25 MG tablet    ALDACTONE    45 tablet    Take 0.5 tablets (12.5 mg) by mouth daily    Acute congestive heart failure, unspecified congestive heart failure type (H)

## 2017-07-14 NOTE — NURSING NOTE
Interrogated patient's DBS Activa SC battery. Patient has LVIM. All impedances were checked at 3.0 Volts and were WNL. See [employer] Neuromodulation sheets scanned. Patient informed.    Therapy impedance = normal    Using contacts = 0-/2+    Model [id number] [id number]    Battery voltage 2.95      MRN [id number]  Diagnosis Tremor

## 2017-07-18 ENCOUNTER — HOSPITAL ENCOUNTER (EMERGENCY)
Facility: CLINIC | Age: 82
Discharge: HOME | End: 2017-07-18
Attending: EMERGENCY MEDICINE | Admitting: EMERGENCY MEDICINE
Payer: MEDICARE

## 2017-07-18 VITALS
DIASTOLIC BLOOD PRESSURE: 60 MMHG | SYSTOLIC BLOOD PRESSURE: 115 MMHG | BODY MASS INDEX: 28.12 KG/M2 | HEART RATE: 68 BPM | OXYGEN SATURATION: 95 % | RESPIRATION RATE: 16 BRPM | WEIGHT: 196 LBS | TEMPERATURE: 97.9 F

## 2017-07-18 DIAGNOSIS — R31.9 HEMATURIA: ICD-10-CM

## 2017-07-18 LAB
ALBUMIN UR-MCNC: 30 MG/DL
APPEARANCE UR: ABNORMAL
BILIRUB UR QL STRIP: NEGATIVE
COLOR UR AUTO: YELLOW
GLUCOSE UR STRIP-MCNC: NEGATIVE MG/DL
HGB UR QL STRIP: ABNORMAL
HYALINE CASTS #/AREA URNS LPF: 2 /LPF (ref 0–2)
KETONES UR STRIP-MCNC: NEGATIVE MG/DL
LEUKOCYTE ESTERASE UR QL STRIP: NEGATIVE
MUCOUS THREADS #/AREA URNS LPF: PRESENT /LPF
NITRATE UR QL: NEGATIVE
PH UR STRIP: 5 PH (ref 5–7)
RBC #/AREA URNS AUTO: >182 /HPF (ref 0–2)
SP GR UR STRIP: 1.02 (ref 1–1.03)
SQUAMOUS #/AREA URNS AUTO: <1 /HPF (ref 0–1)
URN SPEC COLLECT METH UR: ABNORMAL
UROBILINOGEN UR STRIP-MCNC: 2 MG/DL (ref 0–2)
WBC #/AREA URNS AUTO: 24 /HPF (ref 0–2)

## 2017-07-18 PROCEDURE — 81001 URINALYSIS AUTO W/SCOPE: CPT | Performed by: EMERGENCY MEDICINE

## 2017-07-18 PROCEDURE — 87086 URINE CULTURE/COLONY COUNT: CPT | Performed by: EMERGENCY MEDICINE

## 2017-07-18 PROCEDURE — 99283 EMERGENCY DEPT VISIT LOW MDM: CPT

## 2017-07-18 ASSESSMENT — ENCOUNTER SYMPTOMS
FREQUENCY: 1
NAUSEA: 0
HEMATURIA: 1
DYSURIA: 0
DIFFICULTY URINATING: 0
ABDOMINAL PAIN: 0
VOMITING: 0

## 2017-07-18 NOTE — ED PROVIDER NOTES
History     Chief Complaint:  Hematuria    The history is provided by the patient.      Paul Ingram is a 89 year old male who presents to the emergency department today for evaluation of hematuria and is accompanied by his children. The patient reports quite a bit of bleeding with urination since yesterday, with bright red blood leaking from his penis. He reports urinary frequency, and denies any previous history of this, any trouble urinating, dysuria, abdominal pain, testicular pain, nausea, vomiting, or history of abdominal surgeries. Of note, the patient's son had a bladder tumor blocking urine output before.    Allergies:  Aspirin  Atorvastatin  Penicillins  Sulphadimidine [Sulfamethazine]     Medications:    Meclizine  Rosuvastatin  Lisinopril  Isosorbide  Spironolactone  Naproxen  Primidone  Metoprolol  Nitroglycerin     Past Medical History:    Abdominal aneurysm without mention of rupture   Cardiomyopathy, ischemic   CKD (chronic kidney disease)   Coronary artery disease   Difficulty in walking(719.7)   Dyspnea on exertion   Heart attack (H)   History of blood transfusion   Hyperlipidaemia   Hypertension   Ischemic cardiomyopathy   PAD (peripheral artery disease) (H)   Post herpetic neuralgia   PUD (peptic ulcer disease)   Pulmonary embolism (H)   RBBB   Seborrheic keratosis   Tremor    Past Surgical History:    Bilateral carotid endarterectomy  CABG x7  Deep brain stimulator  Replace pulse generator subcutaneous  Replace deep brain stimulator battery/generator    Family History:    Cancer - mother, brother, son  CAD - mother, sister  Diabetes - father    Social History:  The patient was accompanied to the ED by his children.  Smoking Status: Former smoker, quit 1959  Smokeless Tobacco: Never sued  Alcohol Use: Yes  Marital Status:   [5]     Review of Systems   Gastrointestinal: Negative for abdominal pain, nausea and vomiting.   Genitourinary: Positive for frequency and hematuria. Negative  for difficulty urinating, dysuria and testicular pain.   All other systems reviewed and are negative.    Physical Exam   Vitals:  Patient Vitals for the past 24 hrs:   BP Temp Temp src Pulse Resp SpO2 Weight   07/18/17 1251 120/72 97.9  F (36.6  C) Oral 62 22 94 % 88.9 kg (196 lb)     Physical Exam  Constitutional: Patient appears well-developed and well-nourished. Patient is in minimal distress  HENT:    Head: No external signs of trauma noted.   Eyes: Conjunctivae are normal. Pupils are equal, round, and reactive to light.   Cardiovascular:    Normal rate, regular rhythm and normal heart sounds.     Exam reveals no friction rub.     No murmur heard.  Pulmonary/Chest:    Effort normal and breath sounds normal.    No respiratory distress.    There are no wheezes.    There are no rales.   Abdominal:    Soft.    Bowel sounds normal.    There is no distension.    There is no tenderness.    There is no rebound or guarding.   :   Normal appearance of external male genitalia   No active penile discharge or bleeding   Circumcised   Left testicle appears non-swollen. No left testicular tenderness. No masses palpated   Right testicle appears non-swollen. No right testicular tenderness. No masses palpated.   There are somewhat prominent scrotal veins, but no active bleeding varicosities noted.  Musculoskeletal:    Normal range of motion.    Normal Tone  Neurological: Patient is alert and oriented to person, place, and time. There is an intention tremor noted. In the B/L hands.  Skin: Skin is warm and dry. Patient is not diaphoretic. Well healed midline sternotomy, CEA, and brain stimulator surgical scars.    Emergency Department Course     Laboratory:  Laboratory findings were communicated with the patient and family who voiced understanding of the findings.    UA with micro: Urine Blood: Large (A), Protein albumin urine: 30 (A), WBC/HPF: 24 (H), RBC/HPF: >182 (H), Mucous Urine: Present (A) o/w negative  Urine Culture:  Pending    Emergency Department Course:  Nursing notes and vitals reviewed.  I performed an exam of the patient as documented above.   The patient provided a urine sample here in the emergency department. This was sent for laboratory testing, findings above.  At 1455 the patient was rechecked and was updated on the results of laboratory studies.   I discussed the treatment plan with the patient and his children. They expressed understanding of this plan and consented to discharge. He will be discharged home with instructions for care and follow up. In addition, the patient will return to the emergency department if their symptoms persist, worsen, if new symptoms arise or if there is any concern.  All questions were answered.  I personally reviewed the laboratory results with the Patient and his children and answered all related questions prior to discharge.    Impression & Plan      Medical Decision Making:  Paul Ingram is a 89 year old male who presents to the emergency department for hematuria. Please see the HPI for specifics. Urinalysis was obtained which does not show evidence of infection, but does show hematuria. The patient's examination did not give any external source for bleeding. The patient's son notes that he had a bladder tumor in the past and needed treatment for that. At this point, as the patient is hemodynamically stable, I believe we can discharge him, but I will encourage urology follow up in the outpatient setting. Full anticipatory guidance given prior to discharge to the patient and family.    Diagnosis:    ICD-10-CM    1. Hematuria R31.9      Disposition:   Home    Scribe Disclosure:  Ryan SHEN, am serving as a scribe at 12:51 PM on 7/18/2017 to document services personally performed by Rick Merida DO, based on my observations and the provider's statements to me.    7/18/2017   [facility] EMERGENCY DEPARTMENT       Rick Merida DO  07/18/17  [id number]

## 2017-07-18 NOTE — DISCHARGE INSTRUCTIONS
Blood in the Urine    Blood in the urine (hematuria) has many possible causes. If it occurs after an injury (such as a car accident or fall), it is most often a sign of bruising to the kidney or bladder. Common causes of blood in the urine include urinary tract infections, kidney stones, inflammation, tumors, or certain other diseases of the kidney or bladder. Menstruation can cause blood to appear in the urine sample, although it is not coming from the urinary tract.  If only a trace amount of blood is present, it will show up on the urine test, even though the urine may be yellow and not pink or red. This may occur with any of the above conditions, as well as heavy exercise or high fever. In this case, your doctor may want to repeat the urine test on another day. This will show if the blood is still present. If it is, then other tests can be done to find out the cause.  Home care  Follow these home care guidelines:    If your urine does not appear bloody (pink, brown or red) then you do not need to restrict your activity in any way.    If you can see blood in your urine, rest and avoid heavy exertion until your next exam. Do not use aspirin, blood thinners, or anti-platelet or anti-inflammatory medicines. These include ibuprofen and naproxen. These thin the blood and may increase bleeding.  Follow-up care  Follow up with your healthcare provider, or as advised. If you were injured and had blood in your urine, you should have a repeat urine test in 1 to 2 days. Contact your doctor for this test.  A radiologist will review any X-rays that were taken. You will be told of any new findings that may affect your care.  When to seek medical advice  Call your healthcare provider right away if any of these occur:    Bright red blood or blood clots in the urine (if you did not have this before)    Weakness, dizziness or fainting    New groin, abdominal, or back pain    Fever of 100.4 F (38 C) or higher, or as directed by  your healthcare provider    Repeated vomiting    Bleeding from the nose or gums or easy bruising  Date Last Reviewed: 9/1/2016 2000-2017 [employer]. [address] [place] [address], [place] [address]. All rights reserved. This information is not intended as a substitute for professional medical care. Always follow your healthcare professional's instructions.

## 2017-07-18 NOTE — ED AVS SNAPSHOT
[facility] Emergency Department    [address]    [facility] [address]    Phone:  [phone]    Fax:  [phone]                                       Paul Ingram   MRN: [id number]    Department:  [facility] Emergency Department   Date of Visit:  7/18/2017           After Visit Summary Signature Page     I have received my discharge instructions, and my questions have been answered. I have discussed any challenges I see with this plan with the nurse or doctor.    ..........................................................................................................................................  Patient/Patient Representative Signature      ..........................................................................................................................................  Patient Representative Print Name and Relationship to Patient    ..................................................               ................................................  Date                                            Time    ..........................................................................................................................................  Reviewed by Signature/Title    ...................................................              ..............................................  Date                                                            Time

## 2017-07-18 NOTE — ED NOTES
Patient noted bright red blood in his urine since yesterday.  No pain, no nausea, he has note noted frequency of urination.  He does not usually note any leaking of urine, however, he has had some blood leaking from his penis.  He recently started meclizine for vertigo.  ABCs intact.  Patient is alert and oriented x3.

## 2017-07-18 NOTE — ED NOTES
Bed: ED14  Expected date: 7/18/17  Expected time: 12:34 PM  Means of arrival: Ambulance  Comments:  Patel 514- 89y M, blood in urine

## 2017-07-18 NOTE — ED AVS SNAPSHOT
[facility] Emergency Department    [address]    [facility] [address]    Phone:  [phone]    Fax:  [phone]                                       Paul Ingram   MRN: [id number]    Department:  [facility] Emergency Department   Date of Visit:  7/18/2017           Patient Information     Date Of Birth          10/3/1918        Your diagnoses for this visit were:     Hematuria        You were seen by Rick Merida DO.      Follow-up Information     Follow up with [facility] [place]. Call today.    Why:  To schedule a follow-up appointment    Contact information:    [address]  [address]  [facility] [address] [phone]        Follow up with [facility] Emergency Department.    Specialty:  EMERGENCY MEDICINE    Why:  If symptoms worsen    Contact information:    [address]  [facility] [address]  [phone]        Discharge Instructions         Blood in the Urine    Blood in the urine (hematuria) has many possible causes. If it occurs after an injury (such as a car accident or fall), it is most often a sign of bruising to the kidney or bladder. Common causes of blood in the urine include urinary tract infections, kidney stones, inflammation, tumors, or certain other diseases of the kidney or bladder. Menstruation can cause blood to appear in the urine sample, although it is not coming from the urinary tract.  If only a trace amount of blood is present, it will show up on the urine test, even though the urine may be yellow and not pink or red. This may occur with any of the above conditions, as well as heavy exercise or high fever. In this case, your doctor may want to repeat the urine test on another day. This will show if the blood is still present. If it is, then other tests can be done to find out the cause.  Home care  Follow these home care guidelines:    If your urine does not  appear bloody (pink, brown or red) then you do not need to restrict your activity in any way.    If you can see blood in your urine, rest and avoid heavy exertion until your next exam. Do not use aspirin, blood thinners, or anti-platelet or anti-inflammatory medicines. These include ibuprofen and naproxen. These thin the blood and may increase bleeding.  Follow-up care  Follow up with your healthcare provider, or as advised. If you were injured and had blood in your urine, you should have a repeat urine test in 1 to 2 days. Contact your doctor for this test.  A radiologist will review any X-rays that were taken. You will be told of any new findings that may affect your care.  When to seek medical advice  Call your healthcare provider right away if any of these occur:    Bright red blood or blood clots in the urine (if you did not have this before)    Weakness, dizziness or fainting    New groin, abdominal, or back pain    Fever of 100.4 F (38 C) or higher, or as directed by your healthcare provider    Repeated vomiting    Bleeding from the nose or gums or easy bruising  Date Last Reviewed: 9/1/2016 2000-2017 [employer]. [address] [place] [address]. All rights reserved. This information is not intended as a substitute for professional medical care. Always follow your healthcare professional's instructions.          Future Appointments        Provider Department Dept Phone Center    12/15/2017 12:50 PM Manjeet Bragg MD [facility] Neurology [phone] [facility]      24 Hour Appointment Hotline       To make an appointment at any [facility], call [phone] ([phone]). If you don't have a family doctor or clinic, we will help you find one. [facility] are conveniently located to serve the needs of you and your family.             Review of your medicines      Our records show that you are taking the medicines listed below. If these are incorrect, please call your  family doctor or clinic.        Dose / Directions Last dose taken    ALEVE 220 MG tablet   Dose:  220 mg   Quantity:  60 tablet   Generic drug:  naproxen sodium        Take 1 tablet (220 mg) by mouth daily   Refills:  0        carboxymethylcellulose 0.5 % Soln ophthalmic solution   Commonly known as:  REFRESH PLUS   Dose:  1 drop        Place 1 drop into both eyes 3 times daily as needed for dry eyes   Refills:  0        fish oil-omega-3 fatty acids 1000 MG capsule   Dose:  1 capsule        Take 1 capsule by mouth 2 times daily.   Refills:  0        isosorbide mononitrate 30 MG 24 hr tablet   Commonly known as:  IMDUR   Dose:  30 mg        Take 30 mg by mouth daily   Refills:  0        lisinopril 2.5 MG tablet   Commonly known as:  PRINIVIL/Zestril   Dose:  2.5 mg   Quantity:  60 tablet        Take 1 tablet (2.5 mg) by mouth 2 times daily   Refills:  11        meclizine 12.5 MG tablet   Commonly known as:  ANTIVERT   Dose:  12.5 mg   Quantity:  30 tablet        Take 1 tablet (12.5 mg) by mouth 4 times daily as needed for dizziness   Refills:  0        metoprolol 25 MG 24 hr tablet   Commonly known as:  TOPROL-XL   Dose:  25 mg        Take 25 mg by mouth daily.   Refills:  0        NITROSTAT 0.4 MG sublingual tablet   Dose:  1 tablet   Generic drug:  nitroGLYcerin        Place 1 tablet under the tongue every 5 minutes as needed.   Refills:  0        primidone 50 MG tablet   Commonly known as:  MYSOLINE   Dose:  1 tablet        Take 1 tablet by mouth 2 times daily.   Refills:  0        rosuvastatin 40 MG tablet   Commonly known as:  CRESTOR   Dose:  40 mg   Quantity:  90 tablet        Take 1 tablet (40 mg) by mouth At Bedtime   Refills:  1        spironolactone 25 MG tablet   Commonly known as:  ALDACTONE   Dose:  12.5 mg   Quantity:  45 tablet        Take 0.5 tablets (12.5 mg) by mouth daily   Refills:  3                Procedures and tests performed during your visit     UA with Microscopic    Urine Culture       Orders Needing Specimen Collection     None      Pending Results     Date and Time Order Name Status Description    7/18/2017 1315 Urine Culture In process             Pending Culture Results     Date and Time Order Name Status Description    7/18/2017 1315 Urine Culture In process             Pending Results Instructions     If you had any lab results that were not finalized at the time of your Discharge, you can call the ED Lab Result RN at [phone]. You will be contacted by this team for any positive Lab results or changes in treatment. The nurses are available 7 days a week from 10A to 6:30P.  You can leave a message 24 hours per day and they will return your call.        Test Results From Your Hospital Stay        7/18/2017  2:41 PM      Component Results     Component Value Ref Range & Units Status    Color Urine Yellow  Final    Appearance Urine Slightly Cloudy  Final    Glucose Urine Negative NEG mg/dL Final    Bilirubin Urine Negative NEG Final    Ketones Urine Negative NEG mg/dL Final    Specific Gravity Urine 1.019 1.003 - 1.035 Final    Blood Urine Large (A) NEG Final    pH Urine 5.0 5.0 - 7.0 pH Final    Protein Albumin Urine 30 (A) NEG mg/dL Final    Urobilinogen mg/dL 2.0 0.0 - 2.0 mg/dL Final    Nitrite Urine Negative NEG Final    Leukocyte Esterase Urine Negative NEG Final    Source Midstream Urine  Final    WBC Urine 24 (H) 0 - 2 /HPF Final    RBC Urine >182 (H) 0 - 2 /HPF Final    Squamous Epithelial /HPF Urine <1 0 - 1 /HPF Final    Mucous Urine Present (A) NEG /LPF Final    Hyaline Casts 2 0 - 2 /LPF Final         7/18/2017  2:24 PM                Clinical Quality Measure: Blood Pressure Screening     Your blood pressure was checked while you were in the emergency department today. The last reading we obtained was  BP: 120/72 . Please read the guidelines below about what these numbers mean and what you should do about them.  If your systolic blood pressure (the top number) is less than 120  "and your diastolic blood pressure (the bottom number) is less than 80, then your blood pressure is normal. There is nothing more that you need to do about it.  If your systolic blood pressure (the top number) is 120-139 or your diastolic blood pressure (the bottom number) is 80-89, your blood pressure may be higher than it should be. You should have your blood pressure rechecked within a year by a primary care provider.  If your systolic blood pressure (the top number) is 140 or greater or your diastolic blood pressure (the bottom number) is 90 or greater, you may have high blood pressure. High blood pressure is treatable, but if left untreated over time it can put you at risk for heart attack, stroke, or kidney failure. You should have your blood pressure rechecked by a primary care provider within the next 4 weeks.  If your provider in the emergency department today gave you specific instructions to follow-up with your doctor or provider even sooner than that, you should follow that instruction and not wait for up to 4 weeks for your follow-up visit.        Thank you for choosing [facility]       Thank you for choosing [facility] for your care. Our goal is always to provide you with excellent care. Hearing back from our patients is one way we can continue to improve our services. Please take a few minutes to complete the written survey that you may receive in the mail after you visit with us. Thank you!        [employer] Information     [employer] lets you send messages to your doctor, view your test results, renew your prescriptions, schedule appointments and more. To sign up, go to [email or web address] . Click on \"Log in\" on the left side of the screen, which will take you to the Welcome page. Then click on \"Sign up Now\" on the right side of the page.     You will be asked to enter the access code listed below, as well as some personal information. Please follow the directions to create your username and password.   "   Your access code is: [email or web address]  Expires: 2017  3:17 PM     Your access code will  in 90 days. If you need help or a new code, please call your [facility] clinic or [phone].        Care EveryWhere ID     This is your Care EveryWhere ID. This could be used by other organizations to access your [facility] medical records  [id number]        Equal Access to Services     [facility]AL[facility] : Hadii lalit hensleyo Sofrancisco, waaxda luisaeladaha, qaybta kaalmada praful, suzy ovalle . So [facility] [phone].    ATENCIÓN: Si habla geremiasañol, tiene a jansen disposición servicios gratuitos de asistencia lingüística. Llame al [phone].    We comply with applicable federal civil rights laws and [place] laws. We do not discriminate on the basis of race, color, national origin, age, disability sex, sexual orientation or gender identity.            After Visit Summary       This is your record. Keep this with you and show to your community pharmacist(s) and doctor(s) at your next visit.

## 2017-07-19 LAB
BACTERIA SPEC CULT: NO GROWTH
Lab: NORMAL
MICRO REPORT STATUS: NORMAL
SPECIMEN SOURCE: NORMAL

## 2017-08-07 ENCOUNTER — OFFICE VISIT (OUTPATIENT)
Dept: UROLOGY | Facility: CLINIC | Age: 82
End: 2017-08-07
Payer: COMMERCIAL

## 2017-08-07 VITALS — BODY MASS INDEX: 27.06 KG/M2 | OXYGEN SATURATION: 98 % | HEART RATE: 62 BPM | WEIGHT: 189 LBS | HEIGHT: 70 IN

## 2017-08-07 DIAGNOSIS — R31.0 GROSS HEMATURIA: Primary | ICD-10-CM

## 2017-08-07 DIAGNOSIS — N40.1 BENIGN PROSTATIC HYPERPLASIA WITH NOCTURIA: ICD-10-CM

## 2017-08-07 DIAGNOSIS — R35.1 BENIGN PROSTATIC HYPERPLASIA WITH NOCTURIA: ICD-10-CM

## 2017-08-07 PROCEDURE — 52000 CYSTOURETHROSCOPY: CPT | Performed by: UROLOGY

## 2017-08-07 PROCEDURE — 99203 OFFICE O/P NEW LOW 30 MIN: CPT | Mod: 25 | Performed by: UROLOGY

## 2017-08-07 PROCEDURE — 88112 CYTOPATH CELL ENHANCE TECH: CPT | Performed by: UROLOGY

## 2017-08-07 PROCEDURE — 51798 US URINE CAPACITY MEASURE: CPT | Performed by: UROLOGY

## 2017-08-07 RX ORDER — FINASTERIDE 5 MG/1
5 TABLET, FILM COATED ORAL DAILY
Qty: 30 TABLET | Refills: 11 | Status: SHIPPED | OUTPATIENT
Start: 2017-08-07 | End: 2017-12-28

## 2017-08-07 ASSESSMENT — PAIN SCALES - GENERAL: PAINLEVEL: NO PAIN (0)

## 2017-08-07 NOTE — MR AVS SNAPSHOT
After Visit Summary   8/7/2017    Paul Ingram    MRN: [id number]           Patient Information     Date Of Birth          10/3/1918        Visit Information        Provider Department      8/7/2017 2:15 PM Mark Leiva MD; [facility] [place]        Today's Diagnoses     Gross hematuria    -  1    Benign prostatic hyperplasia with nocturia           Follow-ups after your visit        Follow-up notes from your care team     Return in about 4 weeks (around 9/4/2017).      Your next 10 appointments already scheduled     Sep 18, 2017  1:00 PM CDT   CT ABDOMEN PELVIS W/O CONTRAST with [id number] ([facility])    [address]  [facility] [address] [phone]           Please bring any scans or X-rays taken at other hospitals, if similar tests were done. Also bring a list of your medicines, including vitamins, minerals and over-the-counter drugs. It is safest to leave personal items at home.  Be sure to tell your doctor:   If you have any allergies.   If there s any chance you are pregnant.   If you are breastfeeding.   If you have any special needs.  You may have contrast for this exam. To prepare:   Do not eat or drink for 2 hours before your exam. If you need to take medicine, you may take it with small sips of water. (We may ask you to take liquid medicine as well.)   The day before your exam, drink extra fluids at least six 8-ounce glasses (unless your doctor tells you to restrict your fluids).  Patients over 70 or patients with diabetes or kidney problems:   If you haven t had a blood test (creatinine test) within the last 30 days, go to your clinic or Diagnostic Imaging Department for this test.  If you have diabetes:   If your kidney function is normal, continue taking your metformin (Avandamet, Glucophage, Glucovance, Metaglip) on the day of your exam.    If your kidney function is abnormal, wait 48 hours before restarting this medicine.  You will have oral contrast for this exam:   You will drink the contrast at home. Get this from your clinic or Diagnostic Imaging Department. Please follow the directions given.  Please wear loose clothing, such as a sweat suit or jogging clothes. Avoid snaps, zippers and other metal. We may ask you to undress and put on a hospital gown.  If you have any questions, please call the Imaging Department where you will have your exam.            Sep 18, 2017  1:45 PM CDT   Return Visit with Mark Leiva MD   [facility] [place] ([facility])    [address]  [address]  [facility] [address] [phone]            Oct 20, 2017 12:30 PM CDT   LAB with [facility]   [facility] [place] ([facility])    [address]  [facility] [address] [phone]           Patient must bring picture ID. Patient should be prepared to give a urine specimen  Please do not eat 10-12 hours before your appointment if you are coming in fasting for labs on lipids, cholesterol, or glucose (sugar). Pregnant women should follow their Care Team instructions. Water with medications is okay. Do not drink coffee or other fluids. If you have concerns about taking  your medications, please ask at office or if scheduling via [employer], send a message by clicking on Secure Messaging, Message Your Care Team.            Oct 20, 2017  1:00 PM CDT   Ech Complete with [id number]   [facility] CV Echocardiography ([facility])    [address]  [facility] [address] [phone]           1. Please bring or wear a comfortable two-piece outfit. 2. You may eat, drink and take your normal medicines. 3. For any questions that cannot be answered, please contact the ordering  "physician            Oct 24, 2017  1:15 PM CDT   Return Visit with Shady Dela Cruz MD   [facility] [place] ([facility])    [id number]  [facility] [address] [phone]            Dec 15, 2017 12:50 PM CST   (Arrive by 12:35 PM)   Return Movement Disorder with Manjeet Bragg MD   [facility] ([facility] [place])    [address]  3rd [address]  [facility] [address] [phone]              Who to contact     If you have questions or need follow up information about today's clinic visit or your schedule please contact [facility] [place] directly at [phone].  Normal or non-critical lab and imaging results will be communicated to you by MyChart, letter or phone within 4 business days after the clinic has received the results. If you do not hear from us within 7 days, please contact the clinic through MyChart or phone. If you have a critical or abnormal lab result, we will notify you by phone as soon as possible.  Submit refill requests through [employer] or call your pharmacy and they will forward the refill request to us. Please allow 3 business days for your refill to be completed.          Additional Information About Your Visit        HeadMixharMegadyne Information     [employer] lets you send messages to your doctor, view your test results, renew your prescriptions, schedule appointments and more. To sign up, go to [email or web address] . Click on \"Log in\" on the left side of the screen, which will take you to the Welcome page. Then click on \"Sign up Now\" on the right side of the page.     You will be asked to enter the access code listed below, as well as some personal information. Please follow the directions to create your username and password.     Your access code is: [email or web address]  Expires: 2017  4:10 PM     Your access code will  in 90 days. If you need " "help or a new code, please call your [facility] or [phone].        Care EveryWhere ID     This is your Care EveryWhere ID. This could be used by other organizations to access your [facility] medical records  [id number]        Your Vitals Were     Pulse Height Pulse Oximetry BMI (Body Mass Index)          62 1.778 m (5' 10\") 98% 27.12 kg/m2         Blood Pressure from Last 3 Encounters:   07/18/17 115/60   07/10/17 (!) 156/97   05/19/17 110/62    Weight from Last 3 Encounters:   08/07/17 85.7 kg (189 lb)   07/18/17 88.9 kg (196 lb)   05/19/17 88.9 kg (196 lb)              We Performed the Following     CYSTOURETHROSCOPY [31763]     Cytology non gyn [QXQ6057]     MEASURE POST-VOID RESIDUAL URINE/BLADDER CAPACITY, US NON-IMAGING (86851)     UA with Microscopic          Today's Medication Changes          These changes are accurate as of: 8/7/17 11:59 PM.  If you have any questions, ask your nurse or doctor.               Start taking these medicines.        Dose/Directions    finasteride 5 MG tablet   Commonly known as:  PROSCAR   Used for:  Benign prostatic hyperplasia with nocturia   Started by:  Mark Leiva MD        Dose:  5 mg   Take 1 tablet (5 mg) by mouth daily   Quantity:  30 tablet   Refills:  11            Where to get your medicines      Some of these will need a paper prescription and others can be bought over the counter.  Ask your nurse if you have questions.     Bring a paper prescription for each of these medications     finasteride 5 MG tablet                Primary Care Provider Office Phone # Fax #    [facility] [phone] [address] [place] [address]        Equal Access to Services     [facility] PAIGE AH: Alva Bland, daniela luraj, qaybta kaalmada suzy basilio. So [facility] [phone].    ATENCIÓN: Si habla español, tiene a jansen disposición servicios gratuitos de " asistencia lingüística. Lexis al [phone].    We comply with applicable federal civil rights laws and [place] laws. We do not discriminate on the basis of race, color, national origin, age, disability sex, sexual orientation or gender identity.            Thank you!     Thank you for choosing [facility] [place]  for your care. Our goal is always to provide you with excellent care. Hearing back from our patients is one way we can continue to improve our services. Please take a few minutes to complete the written survey that you may receive in the mail after your visit with us. Thank you!             Your Updated Medication List - Protect others around you: Learn how to safely use, store and throw away your medicines at www.disposemymeds.org.          This list is accurate as of: 8/7/17 11:59 PM.  Always use your most recent med list.                   Brand Name Dispense Instructions for use Diagnosis    ALEVE 220 MG tablet   Generic drug:  naproxen sodium     60 tablet    Take 1 tablet (220 mg) by mouth daily    Essential tremor       carboxymethylcellulose 0.5 % Soln ophthalmic solution    REFRESH PLUS     Place 1 drop into both eyes 3 times daily as needed for dry eyes        finasteride 5 MG tablet    PROSCAR    30 tablet    Take 1 tablet (5 mg) by mouth daily    Benign prostatic hyperplasia with nocturia       fish oil-omega-3 fatty acids 1000 MG capsule      Take 1 capsule by mouth 2 times daily.        isosorbide mononitrate 30 MG 24 hr tablet    IMDUR     Take 30 mg by mouth daily        lisinopril 2.5 MG tablet    PRINIVIL/Zestril    60 tablet    Take 1 tablet (2.5 mg) by mouth 2 times daily    Essential hypertension       meclizine 12.5 MG tablet    ANTIVERT    30 tablet    Take 1 tablet (12.5 mg) by mouth 4 times daily as needed for dizziness        metoprolol 25 MG 24 hr tablet    TOPROL-XL     Take 25 mg by mouth daily.        NITROSTAT 0.4 MG sublingual tablet    Generic drug:  nitroGLYcerin      Place 1 tablet under the tongue every 5 minutes as needed.        primidone 50 MG tablet    MYSOLINE     Take 1 tablet by mouth 2 times daily.        rosuvastatin 40 MG tablet    CRESTOR    90 tablet    Take 1 tablet (40 mg) by mouth At Bedtime    Mixed hyperlipidemia       spironolactone 25 MG tablet    ALDACTONE    45 tablet    Take 0.5 tablets (12.5 mg) by mouth daily    Acute congestive heart failure, unspecified congestive heart failure type (H)

## 2017-08-07 NOTE — PROCEDURES
Pre-procedure diagnosis:  Gross Hematuria  Post-procedure diagnosis: Normal cystoscopy  Procedure performed:  Cystourethroscopy  Surgeon:    Mark Leiva MD  Anesthesia:    Local    Indications for procedure:   Paul Ingram is a 89 year old male with a history of gross hematuria.    Description of procedure:   After fully informed, voluntary consent was obtained, the patient was brought into the procedure room, identified and placed in a supine position on the cystoscopy table.  The groin/scrotum were prepped with betadyne and draped in a sterile fashion.  Urojet lidocaine gel was introduced.  A 17F flexible cystoscope was inserted into the urethra, and the bladder and urethra wereexamined in a systematic manner.  The patient tolerated the procedure well and there were no complications.      Cystoscopic findings:  The urethra was normal without strictures.  The prostate was 4cm long and demonstrated moderate bilobar hypertrophy.  There was small median lobe.  The external sphincter coapted  and the bladder neck was open. The bladder was completely surveyed.  There was moderate/severe trabeculation.  There were no neoplasms, stones identifed. Multiple cellules noted.  The ureteric orifices were  normal in position and number and effluxing clear urine. Mild friability noted in prostatic urethra, but no gross lesions noted.    Assessment/Plan:   Paul Ingram is a 89 year old male with a history of gross hematuria, now with findings on cystoscopy.

## 2017-08-07 NOTE — LETTER
8/7/2017       RE: Paul Ingram  [id number]  [facility] [address]     Dear Colleague,    Thank you for referring your patient, Paul Ingram, to the [facility]OLOG[facility] at [facility]. Please see a copy of my visit note below.          Chief Complaint:   Gross hematuria         Consult or Referral:     Mr. Paul Ingram is a 89 year old male seen in consultation from ED provider Dr. Rick Merida.         History of Present Illness:   Paul Ingram is a very pleasant 89 year old male who presents with a history of Gross Hematuria. Seen in ED 7/18/2017 for gross hematuria. Has resolved since. States only had one void with hematuria. No prior hematuria. No fevers/chills. No history of UTIs. No flank pain or stones. Does have remote smoking history. Son had history of bladder tumor.    Feels he empties well. No history of urologic problems or prostate problems in the past. Voids q2-3 hours. Has some urgency. Nocturia q 2 hours.           Past Medical History:     Past Medical History:   Diagnosis Date     Abdominal aneurysm without mention of rupture      Cardiomyopathy, ischemic 7/29/2014     CKD (chronic kidney disease)      Coronary artery disease      Difficulty in walking(719.7)     uses cane     Dyspnea on exertion      Heart attack (H) 7/2014    NSTEMI     History of blood transfusion     family donation     Hyperlipidaemia      Hypertension      Ischemic cardiomyopathy      PAD (peripheral artery disease) (H)     had stent left carotid--> removed     Post herpetic neuralgia      PUD (peptic ulcer disease) 1999?    hx of H Pylori     Pulmonary embolism (H) 2013     RBBB      Seborrheic keratosis      Tremor           Past Surgical History:     Past Surgical History:   Procedure Laterality Date     CAROTID ENDARTERECTOMY  2005    bilateral     CORONARY ARTERY BYPASS  83 and 3/99    83:X 4 grafts, 99:  LIMA to LAD, SVG to PDA, SVG to 2nd OM     deep brain stimulator  2008     REPLACE DEEP BRAIN STIMULATION GENERATOR / BATTERY Left 1/4/2017    Procedure: REPLACE DEEP BRAIN STIMULATION GENERATOR / BATTERY;  Surgeon: Lauri Rosenthal MD;  Location: [facility] OR     REPLACE PULSE GENERATOR SUBCUTANEOUS  3/19/2013    Procedure: REPLACE PULSE GENERATOR SUBCUTANEOUS;  Left Deep Brain Stimulator Battery/Generator Replacement;  Surgeon: Deepali Padilla MD;  Location: [facility] OR          Medications     Current Outpatient Prescriptions   Medication     meclizine (ANTIVERT) 12.5 MG tablet     rosuvastatin (CRESTOR) 40 MG tablet     lisinopril (PRINIVIL/ZESTRIL) 2.5 MG tablet     isosorbide mononitrate (IMDUR) 30 MG 24 hr tablet     spironolactone (ALDACTONE) 25 MG tablet     carboxymethylcellulose (REFRESH PLUS) 0.5 % SOLN     fish oil-omega-3 fatty acids (FISH OIL) 1000 MG capsule     primidone (MYSOLINE) 50 MG tablet     metoprolol (TOPROL-XL) 25 MG 24 hr tablet     naproxen sodium (ALEVE) 220 MG tablet     nitroglycerin (NITROSTAT) 0.4 MG SL tablet     No current facility-administered medications for this visit.             Family History:     Family History   Problem Relation Age of Onset     CANCER Mother      C.A.D. Mother      DIABETES Father      CANCER Brother      C.A.D. Sister      OHS   No known  history  Son with history of bladder cancer         Social History:     Social History     Social History     Marital status:      Spouse name: N/A     Number of children: N/A     Years of education: N/A     Occupational History     Not on file.     Social History Main Topics     Smoking status: Former Smoker     Packs/day: 1.00     Years: 33.00     Types: Cigarettes, Pipe, Cigars     Quit date: 3/12/1959     Smokeless tobacco: Never Used     Alcohol use Yes      Comment: occ.     Drug use: No     Sexual activity: Not on file     Other Topics Concern     Caffeine Concern No     tea     Sleep Concern No     Special  "Diet No     Exercise No     some walking with errands     Seat Belt Yes     Social History Narrative    .  Lives with daughter on 1/3 acre.  Active.    3 children - 1 with CAD and asbestosis exposure.    No family history of bleeding, clotting disorders or complications with anesthesia.                Allergies:   Aspirin; Atorvastatin; Penicillins; and Sulphadimidine [sulfamethazine]         Review of Systems:  From intake questionnaire     Negative 14 system review except as noted on HPI, nurse's note.         Physical Exam:     Patient is a 89 year old  male   Vitals: Pulse 62, height 1.778 m (5' 10\"), weight 85.7 kg (189 lb), SpO2 98 %.  General Appearance Adult: Alert, no acute distress, oriented  HENT: throat/mouth:normal, good dentition  Neck: No adenopathy,masses or thyromegaly  Lungs: no respiratory distress, or pursed lip breathing  Heart: No obvious jugular venous distension present  Abdomen: soft, nontender, no organomegaly or masses, Body mass index is 27.12 kg/(m^2).  Lymphatics: No cervical or supraclavicular adenopathy  Musculoskeltal: extremities normal, no peripheral edema  Skin: no suspicious lesions or rashes  Neuro: Alert, oriented, speech and mentation normal  Psych: affect and mood normal  Gait: Normal  : penis, scrotum, testes normal, CHANDRA anodular, symmetric  PVR: 75 ml      Labs and Pathology:    I reviewed all applicable laboratory and pathology data and went over findings with patient  Significant for   Lab Results   Component Value Date    CR 1.31 07/10/2017    CR 1.45 05/10/2017    CR 1.41 01/04/2017    CR 1.39 12/22/2016    CR 1.45 03/29/2016    CR 1.31 09/02/2014    CR 1.40 08/15/2014    CR 1.51 07/30/2014    CR 1.31 07/22/2014    CR 1.20 07/10/2014         Imaging:    I reviewed all applicable imaging and went over findings with patient.  Abd Ultrasound 7/2014  FINDINGS: The gallbladder is normal in appearance. There is no  evidence for cholelithiasis or biliary obstruction. " Common duct  measures 4.5 mm. The liver and spleen are normal in appearance. The  exam is somewhat limited due to body habitus and bowel gas. The  pancreas is predominately obscured by gas but the head and proximal  body appear normal. The kidneys are mildly echogenic bilaterally with  the right kidney measuring 10.0 x 4.7 x 5.6 cm with AP cortical  thickness of 0.9 cm and the left kidney measures 9.9 x 5.0 x 5.2 cm  with AP cortical thickness of 1.1 cm. There is a distal abdominal  aortic aneurysm currently measuring 5.2 x 4.8 cm in transverse and AP  dimensions. This is unchanged from the aortic ultrasound dated  3/3/2014. The inferior vena cava was poorly seen as was the majority  of the aorta due to bowel gas.     IMPRESSION  IMPRESSION:  1. No evidence for cholelithiasis or biliary obstruction.  2. Mildly echogenic kidneys bilaterally suggestive of chronic renal  disease.  3. Stable distal fusiform aortic aneurysm measuring 5.2 x 4.8 cm in  transverse and AP dimensions.  4. Exam limited due to body habitus and bowel gas.     CT abd AORTA 8/27/2013  No renal or bladder masses present - no gross urologic abnormalities noted      Outside and Past Medical records:    I spent 10 minutes reviewing outside and past medical records.       Assessment and Plan:     Assessment: 89 year old male with gross hematuria. Discussed possible etiologies. Plan for UA/UC, cytology, and cysto today. Regarding upper tract imaging, will not do CT urogram with contrast given patient has baseline CKD and he desires to avoid any risk with contrast. We discussed that this does not fully evaluate the upper tracts and there is a small chance an upper tract lesion may be missed, but given patient's age and comorbidiities, he does not with to have scan with contrast or OR trip for retrograde pyelograms unless other abnormalities or further hematuria are noted. Patient and son expressed understanding and agree.    Noted on cytoscopy today that  patient has trabeculated bladder with cellules and relatively large lateral lobe hypertrophy. Discussed option of alpha blockers, but given unsteady gait, patient not interested in any potential dizzniess, etc. Discussed additional options for medical therapy, and patient desires trial of finasteride as this can also prevent hematuria from BPH. Risks/benefits/side effects discussed.     Plan:  UA and cytology today  PVR  Cystoscopy today in clinic  CT abd/pelvis - non-contrast    Orders  Orders Placed This Encounter   Procedures     MEASURE POST-VOID RESIDUAL URINE/BLADDER CAPACITY, US NON-IMAGING (18406)     CT Abdomen Pelvis w/o Contrast [[id number]]     Cytology non gyn [[id number]]     UA with Microscopic     F/U:  4 weeks for symptom and imaging review    Approximately 40 minutes was spent with patient today, not including cystoscopy, greater than half of which was spent on patient counseling.    Mark Leiva MD  Urology  Pager [phone]

## 2017-08-07 NOTE — PROGRESS NOTES
Chief Complaint:   Gross hematuria         Consult or Referral:     Mr. Paul Ingram is a 89 year old male seen in consultation from ED provider Dr. Rick Merida.         History of Present Illness:   Paul Ingram is a very pleasant 89 year old male who presents with a history of Gross Hematuria. Seen in ED 7/18/2017 for gross hematuria. Has resolved since. States only had one void with hematuria. No prior hematuria. No fevers/chills. No history of UTIs. No flank pain or stones. Does have remote smoking history. Son had history of bladder tumor.    Feels he empties well. No history of urologic problems or prostate problems in the past. Voids q2-3 hours. Has some urgency. Nocturia q 2 hours.           Past Medical History:     Past Medical History:   Diagnosis Date     Abdominal aneurysm without mention of rupture      Cardiomyopathy, ischemic 7/29/2014     CKD (chronic kidney disease)      Coronary artery disease      Difficulty in walking(719.7)     uses cane     Dyspnea on exertion      Heart attack (H) 7/2014    NSTEMI     History of blood transfusion     family donation     Hyperlipidaemia      Hypertension      Ischemic cardiomyopathy      PAD (peripheral artery disease) (H)     had stent left carotid--> removed     Post herpetic neuralgia      PUD (peptic ulcer disease) 1999?    hx of H Pylori     Pulmonary embolism (H) 2013     RBBB      Seborrheic keratosis      Tremor           Past Surgical History:     Past Surgical History:   Procedure Laterality Date     CAROTID ENDARTERECTOMY  2005    bilateral     CORONARY ARTERY BYPASS  83 and 3/99    83:X 4 grafts, 99: LIMA to LAD, SVG to PDA, SVG to 2nd OM     deep brain stimulator  2008     REPLACE DEEP BRAIN STIMULATION GENERATOR / BATTERY Left 1/4/2017    Procedure: REPLACE DEEP BRAIN STIMULATION GENERATOR / BATTERY;  Surgeon: Lauri Rosenthal MD;  Location: [facility] OR     REPLACE PULSE GENERATOR SUBCUTANEOUS  3/19/2013    Procedure:  REPLACE PULSE GENERATOR SUBCUTANEOUS;  Left Deep Brain Stimulator Battery/Generator Replacement;  Surgeon: Deepali Padilla MD;  Location: [facility] OR          Medications     Current Outpatient Prescriptions   Medication     meclizine (ANTIVERT) 12.5 MG tablet     rosuvastatin (CRESTOR) 40 MG tablet     lisinopril (PRINIVIL/ZESTRIL) 2.5 MG tablet     isosorbide mononitrate (IMDUR) 30 MG 24 hr tablet     spironolactone (ALDACTONE) 25 MG tablet     carboxymethylcellulose (REFRESH PLUS) 0.5 % SOLN     fish oil-omega-3 fatty acids (FISH OIL) 1000 MG capsule     primidone (MYSOLINE) 50 MG tablet     metoprolol (TOPROL-XL) 25 MG 24 hr tablet     naproxen sodium (ALEVE) 220 MG tablet     nitroglycerin (NITROSTAT) 0.4 MG SL tablet     No current facility-administered medications for this visit.             Family History:     Family History   Problem Relation Age of Onset     CANCER Mother      C.A.D. Mother      DIABETES Father      CANCER Brother      C.A.D. Sister      OHS   No known  history  Son with history of bladder cancer         Social History:     Social History     Social History     Marital status:      Spouse name: N/A     Number of children: N/A     Years of education: N/A     Occupational History     Not on file.     Social History Main Topics     Smoking status: Former Smoker     Packs/day: 1.00     Years: 33.00     Types: Cigarettes, Pipe, Cigars     Quit date: 3/12/1959     Smokeless tobacco: Never Used     Alcohol use Yes      Comment: occ.     Drug use: No     Sexual activity: Not on file     Other Topics Concern     Caffeine Concern No     tea     Sleep Concern No     Special Diet No     Exercise No     some walking with errands     Seat Belt Yes     Social History Narrative    .  Lives with daughter on [address]/3 acre.  Active.    3 children - 1 with CAD and asbestosis exposure.    No family history of bleeding, clotting disorders or complications with anesthesia.                Allergies:  "  Aspirin; Atorvastatin; Penicillins; and Sulphadimidine [sulfamethazine]         Review of Systems:  From intake questionnaire     Negative 14 system review except as noted on HPI, nurse's note.         Physical Exam:     Patient is a 89 year old  male   Vitals: Pulse 62, height 1.778 m (5' 10\"), weight 85.7 kg (189 lb), SpO2 98 %.  General Appearance Adult: Alert, no acute distress, oriented  HENT: throat/mouth:normal, good dentition  Neck: No adenopathy,masses or thyromegaly  Lungs: no respiratory distress, or pursed lip breathing  Heart: No obvious jugular venous distension present  Abdomen: soft, nontender, no organomegaly or masses, Body mass index is 27.12 kg/(m^2).  Lymphatics: No cervical or supraclavicular adenopathy  Musculoskeltal: extremities normal, no peripheral edema  Skin: no suspicious lesions or rashes  Neuro: Alert, oriented, speech and mentation normal  Psych: affect and mood normal  Gait: Normal  : penis, scrotum, testes normal, CHNADRA anodular, symmetric  PVR: 75 ml      Labs and Pathology:    I reviewed all applicable laboratory and pathology data and went over findings with patient  Significant for   Lab Results   Component Value Date    CR 1.31 07/10/2017    CR 1.45 05/10/2017    CR 1.41 01/04/2017    CR 1.39 12/22/2016    CR 1.45 03/29/2016    CR 1.31 09/02/2014    CR 1.40 08/15/2014    CR 1.51 07/30/2014    CR 1.31 07/22/2014    CR 1.20 07/10/2014         Imaging:    I reviewed all applicable imaging and went over findings with patient.  Abd Ultrasound 7/2014  FINDINGS: The gallbladder is normal in appearance. There is no  evidence for cholelithiasis or biliary obstruction. Common duct  measures 4.5 mm. The liver and spleen are normal in appearance. The  exam is somewhat limited due to body habitus and bowel gas. The  pancreas is predominately obscured by gas but the head and proximal  body appear normal. The kidneys are mildly echogenic bilaterally with  the right kidney measuring 10.0 x " 4.7 x 5.6 cm with AP cortical  thickness of 0.9 cm and the left kidney measures 9.9 x 5.0 x 5.2 cm  with AP cortical thickness of 1.1 cm. There is a distal abdominal  aortic aneurysm currently measuring 5.2 x 4.8 cm in transverse and AP  dimensions. This is unchanged from the aortic ultrasound dated  3/3/2014. The inferior vena cava was poorly seen as was the majority  of the aorta due to bowel gas.     IMPRESSION  IMPRESSION:  1. No evidence for cholelithiasis or biliary obstruction.  2. Mildly echogenic kidneys bilaterally suggestive of chronic renal  disease.  3. Stable distal fusiform aortic aneurysm measuring 5.2 x 4.8 cm in  transverse and AP dimensions.  4. Exam limited due to body habitus and bowel gas.     CT abd AORTA 8/27/2013  No renal or bladder masses present - no gross urologic abnormalities noted      Outside and Past Medical records:    I spent 10 minutes reviewing outside and past medical records.         Assessment and Plan:     Assessment: 89 year old male with gross hematuria. Discussed possible etiologies. Plan for UA/UC, cytology, and cysto today. Regarding upper tract imaging, will not do CT urogram with contrast given patient has baseline CKD and he desires to avoid any risk with contrast. We discussed that this does not fully evaluate the upper tracts and there is a small chance an upper tract lesion may be missed, but given patient's age and comorbidiities, he does not with to have scan with contrast or OR trip for retrograde pyelograms unless other abnormalities or further hematuria are noted. Patient and son expressed understanding and agree.    Noted on cytoscopy today that patient has trabeculated bladder with cellules and relatively large lateral lobe hypertrophy. Discussed option of alpha blockers, but given unsteady gait, patient not interested in any potential dizzniess, etc. Discussed additional options for medical therapy, and patient desires trial of finasteride as this can also  prevent hematuria from BPH. Risks/benefits/side effects discussed.     Plan:  UA and cytology today  PVR  Cystoscopy today in clinic  CT abd/pelvis - non-contrast    Orders  Orders Placed This Encounter   Procedures     MEASURE POST-VOID RESIDUAL URINE/BLADDER CAPACITY, US NON-IMAGING (64612)     CT Abdomen Pelvis w/o Contrast [[id number]]     Cytology non gyn [[id number]]     UA with Microscopic     F/U:  4 weeks for symptom and imaging review    Approximately 40 minutes was spent with patient today, not including cystoscopy, greater than half of which was spent on patient counseling.    Mark Leiva MD  Urology  Pager [phone]

## 2017-08-07 NOTE — NURSING NOTE
Pt has tremors.  Pt only saw blood in his urine 1 time and that was [facility] beginning [facility]  Pt states no pain.  Pt takes fish oil.  Pt has not had any imaging.  TONY Jane, CMA

## 2017-08-09 LAB — COPATH REPORT: NORMAL

## 2017-08-14 LAB
ALBUMIN UR-MCNC: ABNORMAL MG/DL
APPEARANCE UR: ABNORMAL
BILIRUB UR QL STRIP: ABNORMAL
COLOR UR AUTO: ABNORMAL
GLUCOSE UR STRIP-MCNC: ABNORMAL MG/DL
HGB UR QL STRIP: ABNORMAL
KETONES UR STRIP-MCNC: ABNORMAL MG/DL
LEUKOCYTE ESTERASE UR QL STRIP: ABNORMAL
NITRATE UR QL: ABNORMAL
PH UR STRIP: ABNORMAL PH (ref 5–7)
RBC #/AREA URNS AUTO: ABNORMAL /HPF (ref 0–2)
SP GR UR STRIP: ABNORMAL (ref 1–1.03)
URN SPEC COLLECT METH UR: ABNORMAL
UROBILINOGEN UR STRIP-ACNC: ABNORMAL EU/DL (ref 0.2–1)
WBC #/AREA URNS AUTO: ABNORMAL /HPF (ref 0–2)

## 2017-09-18 ENCOUNTER — HOSPITAL ENCOUNTER (OUTPATIENT)
Dept: CT IMAGING | Facility: CLINIC | Age: 82
Discharge: HOME | End: 2017-09-18
Attending: UROLOGY | Admitting: UROLOGY
Payer: MEDICARE

## 2017-09-18 DIAGNOSIS — R31.0 GROSS HEMATURIA: ICD-10-CM

## 2017-09-18 PROCEDURE — 74176 CT ABD & PELVIS W/O CONTRAST: CPT

## 2017-09-29 ENCOUNTER — OFFICE VISIT (OUTPATIENT)
Dept: UROLOGY | Facility: CLINIC | Age: 82
End: 2017-09-29
Payer: COMMERCIAL

## 2017-09-29 VITALS
DIASTOLIC BLOOD PRESSURE: 76 MMHG | SYSTOLIC BLOOD PRESSURE: 170 MMHG | WEIGHT: 198 LBS | HEART RATE: 70 BPM | HEIGHT: 70 IN | BODY MASS INDEX: 28.35 KG/M2

## 2017-09-29 DIAGNOSIS — R31.0 GROSS HEMATURIA: Primary | ICD-10-CM

## 2017-09-29 PROCEDURE — 99212 OFFICE O/P EST SF 10 MIN: CPT | Performed by: UROLOGY

## 2017-09-29 ASSESSMENT — PAIN SCALES - GENERAL: PAINLEVEL: NO PAIN (0)

## 2017-09-29 NOTE — LETTER
"9/29/2017       RE: Paul Ingram  [id number]  [facility] [address]     Dear Colleague,    Thank you for referring your patient, Paul Ingram, to the [facility] UROLOGY [facility]IN[facility] at [facility]. Please see a copy of my visit note below.      CHIEF COMPLAINT  It was my pleasure to see Paul Ingram who is a 89 year old male for follow-up of gross hematuria.      HPI  Paul Ingram is a very pleasant 89 year old male who presents with a history of gross hematuria. Just occurred the one time in July. No hematuria since. Cystoscopy at previous visit was normal and cytology normal. CT results as below. No suspicious findings.    CT abd/pelvis without contrast 9/18/2017  IMPRESSION:   1. Moderate prostatic enlargement has increased since the previous  exam.  2. No urinary calculi or evidence for urinary obstruction.  3. Infrarenal abdominal aortic aneurysm has increased in size,  measuring 5.7 cm AP.    PHYSICAL EXAM  Vitals:    09/29/17 1056   BP: 170/76   Pulse: 70   Weight: 89.8 kg (198 lb)   Height: 1.778 m (5' 10\")     Constitutional: Alert, no acute distress  Psychiatric: Normal mood and affect    ASSESSMENT and PLAN  89 year old male with isolated episode of gross hematuria in July. CT, cystoscopy, and cytology negative. Prostate with considerable BPH likely source of bleeding episode. No further workup indicated at this time. Discussed with patient and family that he has not had true upper tract imaging with contrast, but they decline this at this time. Advised to return for recurrent gross hematuria or new suspicious findings.    Also informed patient that increase in size of AAA was noted on CT and they stated they will share with PCP.    - Follow-up urology cathy Leiva MD  Urology  [facility]  Pager [phone]          "

## 2017-09-29 NOTE — PROGRESS NOTES
"  CHIEF COMPLAINT  It was my pleasure to see Paul Ingram who is a 89 year old male for follow-up of gross hematuria.      HPI  Paul Ingram is a very pleasant 89 year old male who presents with a history of gross hematuria. Just occurred the one time in July. No hematuria since. Cystoscopy at previous visit was normal and cytology normal. CT results as below. No suspicious findings.    CT abd/pelvis without contrast 9/18/2017  IMPRESSION:   1. Moderate prostatic enlargement has increased since the previous  exam.  2. No urinary calculi or evidence for urinary obstruction.  3. Infrarenal abdominal aortic aneurysm has increased in size,  measuring 5.7 cm AP.    PHYSICAL EXAM  Vitals:    09/29/17 1056   BP: 170/76   Pulse: 70   Weight: 89.8 kg (198 lb)   Height: 1.778 m (5' 10\")     Constitutional: Alert, no acute distress  Psychiatric: Normal mood and affect    ASSESSMENT and PLAN  89 year old male with isolated episode of gross hematuria in July. CT, cystoscopy, and cytology negative. Prostate with considerable BPH likely source of bleeding episode. No further workup indicated at this time. Discussed with patient and family that he has not had true upper tract imaging with contrast, but they decline this at this time. Advised to return for recurrent gross hematuria or new suspicious findings.    Also informed patient that increase in size of AAA was noted on CT and they stated they will share with PCP.    - Follow-up urology cathy Leiva MD  Urology  [facility]  Pager [phone]  "

## 2017-09-29 NOTE — MR AVS SNAPSHOT
After Visit Summary   9/29/2017    Paul Ingram    MRN: [id number]           Patient Information     Date Of Birth          10/3/1918        Visit Information        Provider Department      9/29/2017 11:00 AM Mark Leiva MD [facility] [place]        Today's Diagnoses     Gross hematuria    -  1       Follow-ups after your visit        Follow-up notes from your care team     Return if symptoms worsen or fail to improve.      Your next 10 appointments already scheduled     Oct 20, 2017 12:30 PM CDT   LAB with [facility]   [facility] ([facility])    [address] [place] [address] [address]  [facility] [id number] [phone]           Patient must bring picture ID. Patient should be prepared to give a urine specimen  Please do not eat 10-12 hours before your appointment if you are coming in fasting for labs on lipids, cholesterol, or glucose (sugar). Pregnant women should follow their Care Team instructions. Water with medications is okay. Do not drink coffee or other fluids. If you have concerns about taking  your medications, please ask at office or if scheduling via Matchmaker Videos, send a message by clicking on Secure Messaging, Message Your Care Team.            Oct 20, 2017  1:00 PM CDT   Ech Complete with [id number]   [facility] CV Echocardiography ([facility])    [address] [place] [address]  [address]  [facility] [address] [phone]           1. Please bring or wear a comfortable two-piece outfit. 2. You may eat, drink and take your normal medicines. 3. For any questions that cannot be answered, please contact the ordering physician            Oct 24, 2017  1:15 PM CDT   Return Visit with Shady Dela Cruz MD   [facility] ([facility])    [address] [facility] [address]  [facility] [id number] [phone]  "           Dec 15, 2017 12:50 PM CST   (Arrive by 12:35 PM)   Return Movement Disorder with Manjeet Bragg MD   [facility] Neurology ([facility])    [address] [address] [phone]              Who to contact     If you have questions or need follow up information about today's clinic visit or your schedule please contact [facility] directly at [phone].  Normal or non-critical lab and imaging results will be communicated to you by MyChart, letter or phone within 4 business days after the clinic has received the results. If you do not hear from us within 7 days, please contact the clinic through Bee Warehart or phone. If you have a critical or abnormal lab result, we will notify you by phone as soon as possible.  Submit refill requests through Taxi 24/7 or call your pharmacy and they will forward the refill request to us. Please allow 3 business days for your refill to be completed.          Additional Information About Your Visit        Bee WarehariPerceptions Information     Taxi 24/7 lets you send messages to your doctor, view your test results, renew your prescriptions, schedule appointments and more. To sign up, go to [email or web address] . Click on \"Log in\" on the left side of the screen, which will take you to the Welcome page. Then click on \"Sign up Now\" on the right side of the page.     You will be asked to enter the access code listed below, as well as some personal information. Please follow the directions to create your username and password.     Your access code is: [email or web address]  Expires: 2017  4:10 PM     Your access code will  in 90 days. If you need help or a new code, please call your [facility] clinic or [phone].        Care EveryWhere ID     This is your Care EveryWhere ID. This could be used by other organizations to access your [facility] medical records  [id number]        Your Vitals Were  " "   Pulse Height BMI (Body Mass Index)             70 1.778 m (5' 10\") 28.41 kg/m2          Blood Pressure from Last 3 Encounters:   09/29/17 170/76   07/18/17 115/60   07/10/17 (!) 156/97    Weight from Last 3 Encounters:   09/29/17 89.8 kg (198 lb)   09/18/17 90.7 kg (200 lb)   08/07/17 85.7 kg (189 lb)              Today, you had the following     No orders found for display       Primary Care Provider Office Phone # Fax #    [facility] [phone] [address] [place] [address]        Equal Access to Services     RADHAMES GIBSON : Alva Bland, daneila machado, milka kaalmada praful, suzy diaz. So [facility] [phone].    ATENCIÓN: Si habla español, tiene a jansen disposición servicios gratuitos de asistencia lingüística. Llame al [phone].    We comply with applicable federal civil rights laws and [place] laws. We do not discriminate on the basis of race, color, national origin, age, disability, sex, sexual orientation, or gender identity.            Thank you!     Thank you for choosing [facility] [place]  for your care. Our goal is always to provide you with excellent care. Hearing back from our patients is one way we can continue to improve our services. Please take a few minutes to complete the written survey that you may receive in the mail after your visit with us. Thank you!             Your Updated Medication List - Protect others around you: Learn how to safely use, store and throw away your medicines at www.disposemymeds.org.          This list is accurate as of: 9/29/17 12:53 PM.  Always use your most recent med list.                   Brand Name Dispense Instructions for use Diagnosis    ALEVE 220 MG tablet   Generic drug:  naproxen sodium     60 tablet    Take 1 tablet (220 mg) by mouth daily    Essential tremor       carboxymethylcellulose 0.5 % Soln ophthalmic " solution    REFRESH PLUS     Place 1 drop into both eyes 3 times daily as needed for dry eyes        finasteride 5 MG tablet    PROSCAR    30 tablet    Take 1 tablet (5 mg) by mouth daily    Benign prostatic hyperplasia with nocturia       fish oil-omega-3 fatty acids 1000 MG capsule      Take 1 capsule by mouth 2 times daily.        isosorbide mononitrate 30 MG 24 hr tablet    IMDUR     Take 30 mg by mouth daily        lisinopril 2.5 MG tablet    PRINIVIL/Zestril    60 tablet    Take 1 tablet (2.5 mg) by mouth 2 times daily    Essential hypertension       meclizine 12.5 MG tablet    ANTIVERT    30 tablet    Take 1 tablet (12.5 mg) by mouth 4 times daily as needed for dizziness        metoprolol 25 MG 24 hr tablet    TOPROL-XL     Take 25 mg by mouth daily.        NITROSTAT 0.4 MG sublingual tablet   Generic drug:  nitroGLYcerin      Place 1 tablet under the tongue every 5 minutes as needed.        primidone 50 MG tablet    MYSOLINE     Take 1 tablet by mouth 2 times daily.        rosuvastatin 40 MG tablet    CRESTOR    90 tablet    Take 1 tablet (40 mg) by mouth At Bedtime    Mixed hyperlipidemia       spironolactone 25 MG tablet    ALDACTONE    45 tablet    Take 0.5 tablets (12.5 mg) by mouth daily    Acute congestive heart failure, unspecified congestive heart failure type (H)

## 2017-09-29 NOTE — NURSING NOTE
Chief Complaint   Patient presents with     Results     Patient is here for CT results.     Maged Tierney LPN 10:57 AM September 29, 2017      
none required

## 2017-10-09 DIAGNOSIS — I25.10 CORONARY ARTERY DISEASE INVOLVING NATIVE CORONARY ARTERY OF NATIVE HEART WITHOUT ANGINA PECTORIS: Primary | ICD-10-CM

## 2017-10-20 ENCOUNTER — HOSPITAL ENCOUNTER (OUTPATIENT)
Dept: CARDIOLOGY | Facility: CLINIC | Age: 82
Discharge: HOME | End: 2017-10-20
Attending: PHYSICIAN ASSISTANT | Admitting: PHYSICIAN ASSISTANT
Payer: MEDICARE

## 2017-10-20 DIAGNOSIS — E78.2 MIXED HYPERLIPIDEMIA: ICD-10-CM

## 2017-10-20 DIAGNOSIS — I10 ESSENTIAL HYPERTENSION: ICD-10-CM

## 2017-10-20 DIAGNOSIS — I35.0 NONRHEUMATIC AORTIC VALVE STENOSIS: ICD-10-CM

## 2017-10-20 LAB
ALT SERPL W P-5'-P-CCNC: <5 U/L (ref 5–30)
ANION GAP SERPL CALCULATED.3IONS-SCNC: 8.5 MMOL/L (ref 6–17)
BUN SERPL-MCNC: 22 MG/DL (ref 7–30)
CALCIUM SERPL-MCNC: 9.1 MG/DL (ref 8.5–10.5)
CHLORIDE SERPL-SCNC: 106 MMOL/L (ref 98–107)
CHOLEST SERPL-MCNC: 141 MG/DL
CO2 SERPL-SCNC: 32 MMOL/L (ref 23–29)
CREAT SERPL-MCNC: 1.54 MG/DL (ref 0.7–1.3)
GFR SERPL CREATININE-BSD FRML MDRD: 42 ML/MIN/1.7M2
GLUCOSE SERPL-MCNC: 89 MG/DL (ref 70–105)
HDLC SERPL-MCNC: 48 MG/DL
LDLC SERPL CALC-MCNC: 64 MG/DL
NONHDLC SERPL-MCNC: 93 MG/DL
POTASSIUM SERPL-SCNC: 4.5 MMOL/L (ref 3.5–5.1)
SODIUM SERPL-SCNC: 142 MMOL/L (ref 136–145)
TRIGL SERPL-MCNC: 144 MG/DL

## 2017-10-20 PROCEDURE — 40000264 ECHO COMPLETE WITH LUMASON

## 2017-10-20 PROCEDURE — 25500064 ZZH RX 255 OP 636: Performed by: PHYSICIAN ASSISTANT

## 2017-10-20 PROCEDURE — 36415 COLL VENOUS BLD VENIPUNCTURE: CPT | Performed by: INTERNAL MEDICINE

## 2017-10-20 PROCEDURE — 80061 LIPID PANEL: CPT | Performed by: INTERNAL MEDICINE

## 2017-10-20 PROCEDURE — 84460 ALANINE AMINO (ALT) (SGPT): CPT | Performed by: INTERNAL MEDICINE

## 2017-10-20 PROCEDURE — 80048 BASIC METABOLIC PNL TOTAL CA: CPT | Performed by: INTERNAL MEDICINE

## 2017-10-20 PROCEDURE — 93306 TTE W/DOPPLER COMPLETE: CPT | Mod: 26 | Performed by: INTERNAL MEDICINE

## 2017-10-20 RX ADMIN — SULFUR HEXAFLUORIDE 2 ML: KIT at 13:46

## 2017-10-24 ENCOUNTER — OFFICE VISIT (OUTPATIENT)
Dept: CARDIOLOGY | Facility: CLINIC | Age: 82
End: 2017-10-24
Attending: INTERNAL MEDICINE
Payer: COMMERCIAL

## 2017-10-24 VITALS
SYSTOLIC BLOOD PRESSURE: 135 MMHG | WEIGHT: 197.3 LBS | DIASTOLIC BLOOD PRESSURE: 71 MMHG | HEIGHT: 70 IN | HEART RATE: 63 BPM | BODY MASS INDEX: 28.24 KG/M2

## 2017-10-24 DIAGNOSIS — I25.5 CARDIOMYOPATHY, ISCHEMIC: ICD-10-CM

## 2017-10-24 DIAGNOSIS — I10 ESSENTIAL HYPERTENSION: ICD-10-CM

## 2017-10-24 DIAGNOSIS — I24.9 ACS (ACUTE CORONARY SYNDROME) (H): ICD-10-CM

## 2017-10-24 DIAGNOSIS — I71.40 ABDOMINAL AORTIC ANEURYSM WITHOUT RUPTURE (H): ICD-10-CM

## 2017-10-24 DIAGNOSIS — I35.0 NONRHEUMATIC AORTIC VALVE STENOSIS: ICD-10-CM

## 2017-10-24 DIAGNOSIS — I25.10 CORONARY ARTERY DISEASE INVOLVING NATIVE CORONARY ARTERY OF NATIVE HEART WITHOUT ANGINA PECTORIS: ICD-10-CM

## 2017-10-24 DIAGNOSIS — I50.20 SYSTOLIC CONGESTIVE HEART FAILURE, UNSPECIFIED CONGESTIVE HEART FAILURE CHRONICITY: ICD-10-CM

## 2017-10-24 DIAGNOSIS — E78.2 MIXED HYPERLIPIDEMIA: ICD-10-CM

## 2017-10-24 PROCEDURE — 99214 OFFICE O/P EST MOD 30 MIN: CPT | Performed by: INTERNAL MEDICINE

## 2017-10-24 NOTE — MR AVS SNAPSHOT
After Visit Summary   10/24/2017    Paul Ingram    MRN: [id number]           Patient Information     Date Of Birth          10/3/1918        Visit Information        Provider Department      10/24/2017 1:15 PM Shady Dela Cruz MD [facility] [place]        Today's Diagnoses     Coronary artery disease involving native coronary artery of native heart without angina pectoris        Essential hypertension        Mixed hyperlipidemia        Systolic congestive heart failure, unspecified congestive heart failure chronicity (H)        Cardiomyopathy, ischemic        ACS (acute coronary syndrome) (H)        Abdominal aortic aneurysm without rupture (H)        Nonrheumatic aortic valve stenosis           Follow-ups after your visit        Additional Services     Follow-Up with Cardiologist                 Your next 10 appointments already scheduled     Dec 15, 2017 12:50 PM CST   (Arrive by 12:35 PM)   Return Movement Disorder with Manjeet Bragg MD   [facility] ([facility] [place])    [address] [place] [address] [phone]              Future tests that were ordered for you today     Open Future Orders        Priority Expected Expires Ordered    Lipid Profile Routine 4/22/2018 10/24/2018 10/24/2017    ALT Routine 4/22/2018 10/24/2018 10/24/2017    Echocardiogram Routine 4/22/2018 10/24/2018 10/24/2017    Follow-Up with Cardiologist Routine 4/22/2018 10/24/2018 10/24/2017            Who to contact     If you have questions or need follow up information about today's clinic visit or your schedule please contact [facility] [place] directly at [phone].  Normal or non-critical lab and imaging results will be communicated to you by MyChart, letter or phone within 4 business days after the clinic has received the results. If you do not hear from us within 7 days,  "please contact the clinic through Flaviar or phone. If you have a critical or abnormal lab result, we will notify you by phone as soon as possible.  Submit refill requests through Flaviar or call your pharmacy and they will forward the refill request to us. Please allow 3 business days for your refill to be completed.          Additional Information About Your Visit        NewslinesharQueralt Information     Flaviar lets you send messages to your doctor, view your test results, renew your prescriptions, schedule appointments and more. To sign up, go to [email or web address] . Click on \"Log in\" on the left side of the screen, which will take you to the Welcome page. Then click on \"Sign up Now\" on the right side of the page.     You will be asked to enter the access code listed below, as well as some personal information. Please follow the directions to create your username and password.     Your access code is: [email or web address]  Expires: 2017  4:10 PM     Your access code will  in 90 days. If you need help or a new code, please call your [facility] clinic or [phone].        Care EveryWhere ID     This is your Care EveryWhere ID. This could be used by other organizations to access your [facility] medical records  [id number]        Your Vitals Were     Pulse Height BMI (Body Mass Index)             63 1.778 m (5' 10\") 28.31 kg/m2          Blood Pressure from Last 3 Encounters:   10/24/17 135/71   17 170/76   17 115/60    Weight from Last 3 Encounters:   10/24/17 89.5 kg (197 lb 4.8 oz)   17 89.8 kg (198 lb)   17 90.7 kg (200 lb)              We Performed the Following     Follow-Up with Cardiologist        Primary Care Provider Office Phone # Fax #    [facility] [phone] [address] [place] [address]        Equal Access to Services     RADHAMES GIBSON AH: daniela [place], suzy michel " miki anandignacia jerardoana cristina ovalle ah. So [facility] [phone].    ATENCIÓN: Si nettiela josé miguel, tiene a jansen disposición servicios gratuitos de asistencia lingüística. Lexis webb [phone].    We comply with applicable federal civil rights laws and [place] laws. We do not discriminate on the basis of race, color, national origin, age, disability, sex, sexual orientation, or gender identity.            Thank you!     Thank you for choosing [facility] [place]  for your care. Our goal is always to provide you with excellent care. Hearing back from our patients is one way we can continue to improve our services. Please take a few minutes to complete the written survey that you may receive in the mail after your visit with us. Thank you!             Your Updated Medication List - Protect others around you: Learn how to safely use, store and throw away your medicines at www.disposemymeds.org.          This list is accurate as of: 10/24/17  1:41 PM.  Always use your most recent med list.                   Brand Name Dispense Instructions for use Diagnosis    ALEVE 220 MG tablet   Generic drug:  naproxen sodium     60 tablet    Take 1 tablet (220 mg) by mouth daily    Essential tremor       carboxymethylcellulose 0.5 % Soln ophthalmic solution    REFRESH PLUS     Place 1 drop into both eyes 3 times daily as needed for dry eyes        finasteride 5 MG tablet    PROSCAR    30 tablet    Take 1 tablet (5 mg) by mouth daily    Benign prostatic hyperplasia with nocturia       fish oil-omega-3 fatty acids 1000 MG capsule      Take 1 capsule by mouth 2 times daily.        isosorbide mononitrate 30 MG 24 hr tablet    IMDUR     Take 30 mg by mouth daily        lisinopril 2.5 MG tablet    PRINIVIL/Zestril    60 tablet    Take 1 tablet (2.5 mg) by mouth 2 times daily    Essential hypertension       meclizine 12.5 MG tablet    ANTIVERT    30 tablet    Take 1 tablet (12.5 mg) by mouth 4 times daily as needed for  dizziness        metoprolol 25 MG 24 hr tablet    TOPROL-XL     Take 25 mg by mouth daily.        NITROSTAT 0.4 MG sublingual tablet   Generic drug:  nitroGLYcerin      Place 1 tablet under the tongue every 5 minutes as needed.        primidone 50 MG tablet    MYSOLINE     Take 1 tablet by mouth 2 times daily.        rosuvastatin 40 MG tablet    CRESTOR    90 tablet    Take 1 tablet (40 mg) by mouth At Bedtime    Mixed hyperlipidemia       spironolactone 25 MG tablet    ALDACTONE    45 tablet    Take 0.5 tablets (12.5 mg) by mouth daily    Acute congestive heart failure, unspecified congestive heart failure type (H)

## 2017-10-24 NOTE — LETTER
10/24/2017    [facility]  [address] [place] [address]      RE: Paul Ingram       Dear Colleague,    I had the pleasure of seeing Paul Ingram in the [facility].    It is my pleasure to see your patient, Paul Ingram, in followup.  He is a very pleasant 89-year-old gentleman with a history of coronary artery disease and ischemic cardiomyopathy with a past history of congestive heart failure.  Also in the past, he was noted to have moderate aortic stenosis.  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  In 2014, that aneurysm was 5.2 x 4.8 cm, so it is quite similar in size.  He was seen by Dr. Llanos in the past from the Vascular Service and Mr. Ingram clearly stated that he did not want any surgery performed and would like to have it treated conservatively.  He also has a history of hypertension.  He had multiple stents placed in the past.      With that background in mind, Mr. Ingram is doing well.  He is asymptomatic with respect to coronary artery disease with no symptoms of angina pectoris.  He also has no symptoms of congestive heart failure.  He had a limited echocardiogram performed a few days ago, and this showed that the ejection fraction is the same as previously in the 45%-50% range.  He has the mildest form of diastolic dysfunction which is grade I.  Interestingly, the degree of aortic stenosis is actually mild.  His mean gradient was 12.8 mmHg and the calculated aortic valve area was 1.6 square cm.  This is significantly different from his echocardiogram in May when the mean gradient was felt to be 21 mmHg and the valve area was felt to be 0.9 to 1.0 square cm.  The aortic stenosis is asymptomatic.        His lipid profile, which was drawn 4 days ago, was excellent with an LDL of 64, HDL of 48 and triglycerides of 144.  Total cholesterol is 141.  Liver function tests were normal with an ALT of less  than 5.        His blood pressure is in the normal range at 135/71.  In May, it was 110/62 so it is somewhat variable.  He is not complaining of any abdominal discomfort.     Outpatient Encounter Prescriptions as of 10/24/2017   Medication Sig Dispense Refill     finasteride (PROSCAR) 5 MG tablet Take 1 tablet (5 mg) by mouth daily 30 tablet 11     meclizine (ANTIVERT) 12.5 MG tablet Take 1 tablet (12.5 mg) by mouth 4 times daily as needed for dizziness 30 tablet 0     rosuvastatin (CRESTOR) 40 MG tablet Take 1 tablet (40 mg) by mouth At Bedtime 90 tablet 1     lisinopril (PRINIVIL/ZESTRIL) 2.5 MG tablet Take 1 tablet (2.5 mg) by mouth 2 times daily 60 tablet 11     naproxen sodium (ALEVE) 220 MG tablet Take 1 tablet (220 mg) by mouth daily 60 tablet      isosorbide mononitrate (IMDUR) 30 MG 24 hr tablet Take 30 mg by mouth daily       spironolactone (ALDACTONE) 25 MG tablet Take 0.5 tablets (12.5 mg) by mouth daily 45 tablet 3     carboxymethylcellulose (REFRESH PLUS) 0.5 % SOLN Place 1 drop into both eyes 3 times daily as needed for dry eyes       fish oil-omega-3 fatty acids (FISH OIL) 1000 MG capsule Take 1 capsule by mouth 2 times daily.       primidone (MYSOLINE) 50 MG tablet Take 1 tablet by mouth 2 times daily.       metoprolol (TOPROL-XL) 25 MG 24 hr tablet Take 25 mg by mouth daily.       nitroglycerin (NITROSTAT) 0.4 MG SL tablet Place 1 tablet under the tongue every 5 minutes as needed.       No facility-administered encounter medications on file as of 10/24/2017.       IMPRESSION:   1.  Coronary artery disease.  The patient is asymptomatic with respect to coronary artery disease.   2.  Ischemic cardiomyopathy.  The ejection fraction is similar to previously at 45%-50%.   3.  Non-rheumatic aortic stenosis.  The degree of aortic stenosis on the most recent echo was felt to be mild, although in the past it has been noted to be moderate.   4.  Abdominal aortic aneurysm.  The patient has a severely dilated  abdominal aorta but the patient does not want any surgical treatment for this.  He would prefer medical treatment.   5.  Renal insufficiency.  His BUN was 22 and creatinine is 1.54 today with a GFR of 42 and that has been relatively similar to previous renal functions; for instance, in 05/2017, his BUN was 22 and creatinine was 1.45 with a GFR of 45.      PLAN:     1.  I will continue the patient on his present medications as he appears to be doing well and is euvolemic.  2.  The patient has decided not to have an ultrasound of his abdominal aorta since he feels that he is not going to do anything with the results  anyway and I think that is a reasonable approach.     3.  We will continue the patient on his present medications which appeared to be controlling his blood pressure quite well for him.      As always, he has been told to contact me if he has any questions or any concerns.            It has been my pleasure to be involved in the care of this very nice patient.     Sincerely,    Shady Dela Cruz MD    [facility]

## 2017-10-24 NOTE — PROGRESS NOTES
HISTORY OF PRESENT ILLNESS:  It is my pleasure to see your patient, Paul Ingram, in followup.  He is a very pleasant 89-year-old gentleman with a history of coronary artery disease and ischemic cardiomyopathy with a past history of congestive heart failure.  Also in the past, he was noted to have moderate aortic stenosis.  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  In 2014, that aneurysm was 5.2 x 4.8 cm, so it is quite similar in size.  He was seen by Dr. Llanos in the past from the Vascular Service and Mr. Ingram clearly stated that he did not want any surgery performed and would like to have it treated conservatively.  He also has a history of hypertension.  He had multiple stents placed in the past.      With that background in mind, Mr. Ingram is doing well.  He is asymptomatic with respect to coronary artery disease with no symptoms of angina pectoris.  He also has no symptoms of congestive heart failure.  He had a limited echocardiogram performed a few days ago, and this showed that the ejection fraction is the same as previously in the 45%-50% range.  He has the mildest form of diastolic dysfunction which is grade I.  Interestingly, the degree of aortic stenosis is actually mild.  His mean gradient was 12.8 mmHg and the calculated aortic valve area was 1.6 square cm.  This is significantly different from his echocardiogram in May when the mean gradient was felt to be 21 mmHg and the valve area was felt to be 0.9 to 1.0 square cm.  The aortic stenosis is asymptomatic.        His lipid profile, which was drawn 4 days ago, was excellent with an LDL of 64, HDL of 48 and triglycerides of 144.  Total cholesterol is 141.  Liver function tests were normal with an ALT of less than 5.        His blood pressure is in the normal range at 135/71.  In May, it was 110/62 so it is somewhat variable.  He is not complaining of any abdominal discomfort.      IMPRESSION:   1.  Coronary artery disease.   The patient is asymptomatic with respect to coronary artery disease.   2.  Ischemic cardiomyopathy.  The ejection fraction is similar to previously at 45%-50%.   3.  Non-rheumatic aortic stenosis.  The degree of aortic stenosis on the most recent echo was felt to be mild, although in the past it has been noted to be moderate.   4.  Abdominal aortic aneurysm.  The patient has a severely dilated abdominal aorta but the patient does not want any surgical treatment for this.  He would prefer medical treatment.   5.  Renal insufficiency.  His BUN was 22 and creatinine is 1.54 today with a GFR of 42 and that has been relatively similar to previous renal functions; for instance, in 2017, his BUN was 22 and creatinine was 1.45 with a GFR of 45.      PLAN:     1.  I will continue the patient on his present medications as he appears to be doing well and is euvolemic.  2.  The patient has decided not to have an ultrasound of his abdominal aorta since he feels that he is not going to do anything with the results  anyway and I think that is a reasonable approach.     3.  We will continue the patient on his present medications which appeared to be controlling his blood pressure quite well for him.      As always, he has been told to contact me if he has any questions or any concerns.      It has been my pleasure to be involved in the care of this very nice patient.      cc:   [facility]   [address] [place] [address]         CY PULIDO MD, [facility]             D: 10/24/2017 13:40   T: 10/24/2017 15:44   MT: KELLEE      Name:     RENAY FULLER   MRN:      [id number]        Account:      [id number]   :      10/03/1918           Service Date: 10/24/2017      Document: [id number]

## 2017-10-24 NOTE — PROGRESS NOTES
HPI and Plan:   See dictation    Orders Placed This Encounter   Procedures     Lipid Profile     ALT     Follow-Up with Cardiologist     Echocardiogram       No orders of the defined types were placed in this encounter.      There are no discontinued medications.      Encounter Diagnoses   Name Primary?     Coronary artery disease involving native coronary artery of native heart without angina pectoris      Essential hypertension      Mixed hyperlipidemia      Systolic congestive heart failure, unspecified congestive heart failure chronicity (H)      Cardiomyopathy, ischemic      ACS (acute coronary syndrome) (H)      Abdominal aortic aneurysm without rupture (H)      Nonrheumatic aortic valve stenosis        CURRENT MEDICATIONS:  Current Outpatient Prescriptions   Medication Sig Dispense Refill     finasteride (PROSCAR) 5 MG tablet Take 1 tablet (5 mg) by mouth daily 30 tablet 11     meclizine (ANTIVERT) 12.5 MG tablet Take 1 tablet (12.5 mg) by mouth 4 times daily as needed for dizziness 30 tablet 0     rosuvastatin (CRESTOR) 40 MG tablet Take 1 tablet (40 mg) by mouth At Bedtime 90 tablet 1     lisinopril (PRINIVIL/ZESTRIL) 2.5 MG tablet Take 1 tablet (2.5 mg) by mouth 2 times daily 60 tablet 11     naproxen sodium (ALEVE) 220 MG tablet Take 1 tablet (220 mg) by mouth daily 60 tablet      isosorbide mononitrate (IMDUR) 30 MG 24 hr tablet Take 30 mg by mouth daily       spironolactone (ALDACTONE) 25 MG tablet Take 0.5 tablets (12.5 mg) by mouth daily 45 tablet 3     carboxymethylcellulose (REFRESH PLUS) 0.5 % SOLN Place 1 drop into both eyes 3 times daily as needed for dry eyes       fish oil-omega-3 fatty acids (FISH OIL) 1000 MG capsule Take 1 capsule by mouth 2 times daily.       primidone (MYSOLINE) 50 MG tablet Take 1 tablet by mouth 2 times daily.       metoprolol (TOPROL-XL) 25 MG 24 hr tablet Take 25 mg by mouth daily.       nitroglycerin (NITROSTAT) 0.4 MG SL tablet Place 1 tablet under the tongue every 5  minutes as needed.         ALLERGIES     Allergies   Allergen Reactions     Aspirin      bleeding     Atorvastatin      Leg weakness     Penicillins Rash     Sulphadimidine [Sulfamethazine] Rash       PAST MEDICAL HISTORY:  Past Medical History:   Diagnosis Date     Abdominal aneurysm without mention of rupture      Cardiomyopathy, ischemic 7/29/2014     CKD (chronic kidney disease)      Coronary artery disease      Difficulty in walking(719.7)     uses cane     Dyspnea on exertion      Heart attack 7/2014    NSTEMI     History of blood transfusion     family donation     Hyperlipidaemia      Hypertension      Ischemic cardiomyopathy      Mumps      PAD (peripheral artery disease) (H)     had stent left carotid--> removed     Post herpetic neuralgia      PUD (peptic ulcer disease) 1999?    hx of H Pylori     Pulmonary embolism (H) 2013     RBBB      Seborrheic keratosis      Tremor        PAST SURGICAL HISTORY:  Past Surgical History:   Procedure Laterality Date     CAROTID ENDARTERECTOMY  2005    bilateral     CORONARY ARTERY BYPASS  83 and 3/99    83:X 4 grafts, 89: LIMA to LAD, SVG to PDA, SVG to 2nd OM     deep brain stimulator  2008     REPLACE DEEP BRAIN STIMULATION GENERATOR / BATTERY Left 1/4/2017    Procedure: REPLACE DEEP BRAIN STIMULATION GENERATOR / BATTERY;  Surgeon: Lauri Rosenthal MD;  Location: [facility] OR     REPLACE PULSE GENERATOR SUBCUTANEOUS  3/19/2013    Procedure: REPLACE PULSE GENERATOR SUBCUTANEOUS;  Left Deep Brain Stimulator Battery/Generator Replacement;  Surgeon: Deepali Padilla MD;  Location: [facility] OR       FAMILY HISTORY:  Family History   Problem Relation Age of Onset     CANCER Mother      C.A.D. Mother      DIABETES Father      CANCER Brother      C.A.D. Sister      OHS       SOCIAL HISTORY:  Social History     Social History     Marital status:      Spouse name: N/A     Number of children: N/A     Years of education: N/A     Social History Main Topics     Smoking status:  "Former Smoker     Packs/day: 1.00     Years: 33.00     Types: Cigarettes, Pipe, Cigars     Quit date: 3/12/1959     Smokeless tobacco: Never Used     Alcohol use Yes      Comment: occ.     Drug use: No     Sexual activity: Not Asked     Other Topics Concern     Caffeine Concern No     tea     Sleep Concern No     Special Diet No     Exercise No     some walking with errands     Seat Belt Yes     Social History Narrative    .  Lives with daughter on 1/3 acre.  Active.    3 children - 1 with CAD and asbestosis exposure.    No family history of bleeding, clotting disorders or complications with anesthesia.           Review of Systems:  Skin:  Positive for bruising thin skin ,  has a wound on right arm from a fall a 7-10 days ago    Eyes:  Positive for glasses    ENT:  Positive for hearing loss;sinus trouble drippy nose   Respiratory:  Negative       Cardiovascular:    Positive for    Gastroenterology: Negative      Genitourinary:  Positive for nocturia 2-3 times per pm  Musculoskeletal:  Positive for joint pain;joint stiffness    Neurologic:  Positive for tremors;numbness or tingling of hands fingers go to sleep at night - when sleeping - per pt   Psychiatric:  Negative      Heme/Lymph/Imm:  Positive for allergies    Endocrine:  Negative        Physical Exam:  Vitals: /71  Pulse 63  Ht 1.778 m (5' 10\")  Wt 89.5 kg (197 lb 4.8 oz)  BMI 28.31 kg/m2    Constitutional:  cooperative, alert and oriented, well developed, well nourished, in no acute distress;cooperative        Skin:  warm and dry to the touch   thin skin, abrasion on right forearm that is wrapped/bandaged     Head:  normocephalic, no masses or lesions        Eyes:  pupils equal and round;conjunctivae and lids unremarkable;sclera white        ENT:  no pallor or cyanosis        Neck:  not assessed this visit transmitted murmur      Chest:  normal breath sounds, clear to auscultation, normal A-P diameter, normal symmetry, normal respiratory " excursion, no use of accessory muscles          Cardiac: regular rhythm;normal S1 and S2   S4   systolic ejection murmur;grade 2;radiation to the carotid;RUSB          Abdomen:  abdomen soft;non-tender;BS normoactive   ENALARED ABDOMINAL AORTA. ANEURYSM IS NON TENDER    Vascular: not assessed this visit                                        Extremities and Back:  no deformities, clubbing, cyanosis, erythema observed;no edema              Neurological:  affect appropriate, oriented to time, person and place course tremors using a cane          CC  Shady Dela Cruz MD  [address]  [place] [address]

## 2017-12-15 ENCOUNTER — OFFICE VISIT (OUTPATIENT)
Dept: NEUROLOGY | Facility: CLINIC | Age: 82
End: 2017-12-15

## 2017-12-15 VITALS
DIASTOLIC BLOOD PRESSURE: 64 MMHG | BODY MASS INDEX: 28.9 KG/M2 | HEIGHT: 69 IN | RESPIRATION RATE: 20 BRPM | WEIGHT: 195.1 LBS | SYSTOLIC BLOOD PRESSURE: 117 MMHG | TEMPERATURE: 98.1 F | HEART RATE: 78 BPM | OXYGEN SATURATION: 95 %

## 2017-12-15 DIAGNOSIS — R25.1 TREMOR: Primary | ICD-10-CM

## 2017-12-15 ASSESSMENT — PAIN SCALES - GENERAL: PAINLEVEL: NO PAIN (0)

## 2017-12-15 NOTE — NURSING NOTE
Chief Complaint   Patient presents with     RECHECK     [facility]- MOVEMENT DISORDER, 1 YEAR F/U     Santi Schaefer, CMA

## 2017-12-15 NOTE — MR AVS SNAPSHOT
After Visit Summary   12/15/2017    Paul Ingram    MRN: [id number]           Patient Information     Date Of Birth          10/3/1918        Visit Information        Provider Department      12/15/2017 12:50 PM Manjeet Bragg MD [facility]        Care Instructions    We did not make any changes to your Deep Brain Stimulator today. Call us if you develop any new problems.          Follow-ups after your visit        Follow-up notes from your care team     Return in about 1 year (around 12/15/2018).      Your next 10 appointments already scheduled     Dec 14, 2018 12:50 PM CST   (Arrive by 12:35 PM)   Return Movement Disorder with Manjeet Bragg MD   [facility] ([facility])    [address] [place] [id number] [phone]              Who to contact     Please call your clinic at [phone] to:    Ask questions about your health    Make or cancel appointments    Discuss your medicines    Learn about your test results    Speak to your doctor   If you have compliments or concerns about an experience at your clinic, or if you wish to file a complaint, please contact [facility] Patient Relations at [phone] or email us at [email or web address]         Additional Information About Your Visit        MyChart Information     [employer] is an electronic gateway that provides easy, online access to your medical records. With eEye, you can request a clinic appointment, read your test results, renew a prescription or communicate with your care team.     To sign up for [employer] visit the website at [email or web address]   You will be asked to enter the access code listed below, as well as some personal information. Please follow the directions to create your username and password.     Your access code is: [email or web address]  Expires: 3/1/2018  6:30 AM     Your access code will  in 90  "days. If you need help or a new code, please contact your [facility] or call [phone] for assistance.        Care EveryWhere ID     This is your Care EveryWhere ID. This could be used by other organizations to access your [facility] medical records  [id number]        Your Vitals Were     Pulse Temperature Respirations Height Pulse Oximetry BMI (Body Mass Index)    78 98.1  F (36.7  C) 20 1.753 m (5' 9\") 95% 28.81 kg/m2       Blood Pressure from Last 3 Encounters:   12/15/17 117/64   10/24/17 135/71   09/29/17 170/76    Weight from Last 3 Encounters:   12/15/17 88.5 kg (195 lb 1.6 oz)   10/24/17 89.5 kg (197 lb 4.8 oz)   09/29/17 89.8 kg (198 lb)              Today, you had the following     No orders found for display       Primary Care Provider Office Phone # Fax #    [facility] [phone] [address] [place] [address]        Equal Access to Services     [facility] CenterNIRALI : Hadii lalit cochran hadeusebio Sofrancisco, waaxda luqadaha, qaybta kaalmada praful, suzy diaz. So [place] [phone].    ATENCIÓN: Si habla español, tiene a jansen disposición servicios gratuitos de asistencia lingüística. Lexis al [phone].    We comply with applicable federal civil rights laws and [place] laws. We do not discriminate on the basis of race, color, national origin, age, disability, sex, sexual orientation, or gender identity.            Thank you!     Thank you for choosing [facility]  for your care. Our goal is always to provide you with excellent care. Hearing back from our patients is one way we can continue to improve our services. Please take a few minutes to complete the written survey that you may receive in the mail after your visit with us. Thank you!             Your Updated Medication List - Protect others around you: Learn how to safely use, store and throw away your medicines at www.disposemymeds.org. "          This list is accurate as of: 12/15/17  1:41 PM.  Always use your most recent med list.                   Brand Name Dispense Instructions for use Diagnosis    ALEVE 220 MG tablet   Generic drug:  naproxen sodium     60 tablet    Take 1 tablet (220 mg) by mouth daily    Essential tremor       carboxymethylcellulose 0.5 % Soln ophthalmic solution    REFRESH PLUS     Place 1 drop into both eyes 3 times daily as needed for dry eyes        finasteride 5 MG tablet    PROSCAR    30 tablet    Take 1 tablet (5 mg) by mouth daily    Benign prostatic hyperplasia with nocturia       fish oil-omega-3 fatty acids 1000 MG capsule      Take 1 capsule by mouth 2 times daily.        isosorbide mononitrate 30 MG 24 hr tablet    IMDUR     Take 30 mg by mouth daily        lisinopril 2.5 MG tablet    PRINIVIL/Zestril    60 tablet    Take 1 tablet (2.5 mg) by mouth 2 times daily    Essential hypertension       meclizine 12.5 MG tablet    ANTIVERT    30 tablet    Take 1 tablet (12.5 mg) by mouth 4 times daily as needed for dizziness        metoprolol 25 MG 24 hr tablet    TOPROL-XL     Take 25 mg by mouth daily.        NITROSTAT 0.4 MG sublingual tablet   Generic drug:  nitroGLYcerin      Place 1 tablet under the tongue every 5 minutes as needed.        primidone 50 MG tablet    MYSOLINE     Take 1 tablet by mouth 2 times daily.        rosuvastatin 40 MG tablet    CRESTOR    90 tablet    Take 1 tablet (40 mg) by mouth At Bedtime    Mixed hyperlipidemia       spironolactone 25 MG tablet    ALDACTONE    45 tablet    Take 0.5 tablets (12.5 mg) by mouth daily    Acute congestive heart failure, unspecified congestive heart failure type (H)

## 2017-12-15 NOTE — PROGRESS NOTES
"[facility] Disorders [facility] Follow-up    CC: ET follow-up    HPI: Paul Ingram is a 89 year-old male with a history of ET s/p L VIM DBS who presents in follow-up. He was last seen one year ago. At that time has DBS battery was at [facility]. He was assessed both on and off DBS and due to good effect he was sent for IPG replacement which was completed 1/4/17. He has not noted any changes in his tremor since then.     He has noted imbalance for past year. Falls were often at one point, but not falling at all lately. None for a couple of months. Always uses a cane. One fall he remembers going down step to get mail and turning at same time. Usually falls forward. No light-headedness. No dysarthria.      Lives with daughter. Still driving. Independent.    Medications:  Current Outpatient Prescriptions   Medication     finasteride (PROSCAR) 5 MG tablet     meclizine (ANTIVERT) 12.5 MG tablet     rosuvastatin (CRESTOR) 40 MG tablet     lisinopril (PRINIVIL/ZESTRIL) 2.5 MG tablet     naproxen sodium (ALEVE) 220 MG tablet     isosorbide mononitrate (IMDUR) 30 MG 24 hr tablet     spironolactone (ALDACTONE) 25 MG tablet     carboxymethylcellulose (REFRESH PLUS) 0.5 % SOLN     fish oil-omega-3 fatty acids (FISH OIL) 1000 MG capsule     primidone (MYSOLINE) 50 MG tablet     metoprolol (TOPROL-XL) 25 MG 24 hr tablet     nitroglycerin (NITROSTAT) 0.4 MG SL tablet     No current facility-administered medications for this visit.        Exam:  /64  Pulse 78  Temp 98.1  F (36.7  C)  Resp 20  Ht 1.753 m (5' 9\")  Wt 88.5 kg (195 lb 1.6 oz)  SpO2 95%  BMI 28.81 kg/m2    Neurological Examination (R/L):  Tremor rating as below. Mildly wide-based, slow gait with some decreased right arm swing (arm held more abducted). Could stand with feet together, but with notable imbalance.    Fahn Tasia Le tremor rating scale    1. Face Tremor   REST 2  2. Tongue Tremor   REST 0   POST 0  3. Voice Tremor   ACT/INT 1  4. " Head Tremor   REST 0   POST 1  5. RUE tremor   REST 0   POST 2   ACT/INT 3  6. LUE tremor   REST 1   POST 3   ACT/INT 4  7. Trunk tremor   REST 0   POST 0  8. RLE tremor   REST 0   POST 0   ACT/INT 0  9. LLE tremor   REST 0   POST 0   ACT/INT 0  10. Handwriting 4  11. Drawing A   R 4   L 3  12. Drawing B   R 4   L 4  13. Drawing C   R 4   L 4  14. Pouring : again imputed 4  15. Speaking 1  16. Feeding 2  17. Liquids 3, has to hold over bowl or sink or cannot use spoon or drink  18. Hygiene 2, needs electric razor  19. Dressing 0  20. Writing 3  21. Working 0 but not doing any hobbies, just makes coffee and watches TV  TOTAL: 59      Battery status 2.94V  ON/OFF: ON  Implanted generator: Activa SC  Lead site(s): L VIM  Impedance Check: all electrodes OK     Group (A) Left Brain         Right Brain N/A       Initial Final Initial Final   Amplitude (V/mA)  3.9V  same       Pulse width (usec)  60         Freq (Hz)  145         Contacts: C           Contacts: 3           Contacts: 2  +         Contacts: 1           Contacts: 0  -             Therapy impedances:  Group A: 986 ohms, 3.988 mA    Assessment: Stable ET s/p L VIM DBS. No changes were made to his DBS today, which is working properly and impedances WNL.    Plan:   Follow-up in 1 year    Judah Palomares MD  Movement Disorders Fellow    Patient seen and examined with Dr. Palomares and I agree with the assessment and plan as outlined.    Manjeet Bragg PhD, MD

## 2017-12-15 NOTE — PATIENT INSTRUCTIONS
We did not make any changes to your Deep Brain Stimulator today. Call us if you develop any new problems.

## 2017-12-28 DIAGNOSIS — I10 ESSENTIAL HYPERTENSION: ICD-10-CM

## 2017-12-28 DIAGNOSIS — I50.9 ACUTE CONGESTIVE HEART FAILURE, UNSPECIFIED CONGESTIVE HEART FAILURE TYPE: ICD-10-CM

## 2017-12-28 RX ORDER — SPIRONOLACTONE 25 MG/1
25 TABLET ORAL DAILY
Qty: 30 TABLET | Refills: 0 | COMMUNITY
Start: 2017-12-28

## 2017-12-28 RX ORDER — LISINOPRIL 2.5 MG/1
2.5 TABLET ORAL DAILY
Qty: 30 TABLET | Refills: 0 | COMMUNITY
Start: 2017-12-28

## 2017-12-29 ENCOUNTER — MEDICAL CORRESPONDENCE (OUTPATIENT)
Dept: HEALTH INFORMATION MANAGEMENT | Facility: CLINIC | Age: 82
End: 2017-12-29

## 2018-02-07 DIAGNOSIS — E78.2 MIXED HYPERLIPIDEMIA: ICD-10-CM

## 2018-02-07 RX ORDER — ROSUVASTATIN CALCIUM 40 MG/1
40 TABLET, COATED ORAL AT BEDTIME
Qty: 90 TABLET | Refills: 2 | Status: SHIPPED | OUTPATIENT
Start: 2018-02-07

## 2019-10-04 NOTE — ANESTHESIA PREPROCEDURE EVALUATION
Anesthesia Evaluation     . Pt has had prior anesthetic. Type: General and MAC    History of anesthetic complications     ROS/MED HX    ENT/Pulmonary:     (+)tobacco use, Past use 1 ppd packs/day  , . .    Neurologic:     (+)other neuro tremors    Cardiovascular: Comment: AAA 5.1 cm (unchanged).  Wants conservative mgmt.     (+) Dyslipidemia, hypertension-range: 120/70, Peripheral Vascular Disease-- Carotid Stenosis and Non Symptomatic, CAD, -past MI,CABG-date: 1970's and 1980's, had carotid stent that was later removed (left)  . Taking blood thinners : . CHF etiology: Ischemic CM Last EF: 40-45% date: 2014 NYHA classification: I. . :. . Previous cardiac testing      (-) angina, RIOS, orthopnea/PND, syncope, stent and angina   METS/Exercise Tolerance:  >4 METS   Hematologic: Comments: PE 2013 after surgery    (+) History of blood clots pt is not anticoagulated, History of Transfusion no previous transfusion reaction -      Musculoskeletal: Comment: Left knee pain  (+) arthritis, , , -       GI/Hepatic:  - neg GI/hepatic ROS   (+) Other GI/Hepatic PUD hx of GIB     (-) GERD   Renal/Genitourinary:  - ROS Renal section negative       Endo:  - neg endo ROS       Psychiatric:  - neg psychiatric ROS       Infectious Disease:  - neg infectious disease ROS       Malignancy:      - no malignancy   Other:               Physical Exam      Airway   Mallampati: II  TM distance: >3 FB  Neck ROM: limited    Dental   (+) upper dentures and missing    Cardiovascular   Rhythm and rate: regular and normal  (+) peripheral edema       Pulmonary    breath sounds clear to auscultation    Other findings:   Echocardiogram 2014  Technically difficult study  The visual LV ejection fraction is estimated at   40-45%. Moderate apical hypokinesia and severe inferolateral hypokiensia.   Moderate aortic valve stenosis There is no pericardial effusion.  Compared to   echo dated 02/10/2014 no significant changes.    The left ventricle is mildly  dilated.  There is borderline concentric left ventricular hypertrophy.  The transmitral spectral Doppler flow pattern is suggestive of diastolic   dysfunction of the left ventricle.  E by E prime ratio is greater than 15, that likely suggests increased left   ventricular filling pressures.    CT cor 7/2014  1.  Severe three-vessel native coronary artery disease is noted.  2.  Widely patent LIMA to LAD graft and SVG to rPDA graft with good  retrograde flow  3.  Occluded SVG to OM graft.     12/22/2016 12:21  Sodium: 143  Potassium: 4.5  Chloride: 108  Carbon Dioxide: 30  Urea Nitrogen: 24  Creatinine: 1.39 (H)  GFR Estimate: 47 (L)  GFR Estimate If Black: 57 (L)  Calcium: 9.3  Anion Gap: 5  Glucose: 66 (L)    WBC: 6.8  Hemoglobin: 14.0  Hematocrit: 42.8  Platelet Count: 118 (L)               PAC Discussion and Assessment    ASA Classification: 3  Case is suitable for:   Anesthetic techniques and relevant risks discussed: MAC with GA as backup  Invasive monitoring and risk discussed: No  Types:   Possibility and Risk of blood transfusion discussed: No  NPO instructions given:   Additional anesthetic preparation and risks discussed:   Needs early admission to pre-op area:   Other:     PAC Resident/NP Anesthesia Assessment:  Scheduled for generator battery change on TBD by Dr. Rosenthal in treatment of end of life battery for essential tremor.  PAC referral for risk assessment and optimization for anesthesia with comorbid conditions of:    History of asystole vs heart block when original DBS placed many years ago.  No problems with generator change 2013.  Followed by cardiology.  No further recommendations since under MAC + local.     Pre-operative considerations:  1.  Cardiac:  Functional status good for age 89.  History of CAB in early 1970's then redo CAB 1980's.  No cardiac stents.  No LM disease.  Patent LIMA-LAD, SV-PDA.  Occluded SV-OM. History of ischemic CM EF 40-45% with moderate AV stenosis (has murmur).  Also  "history of PAD with left carotid stent that was later removed.  Has AAA 5.1cm stable and monitored by cardiology.  Does not want surgery unless if ruptures then would want repair if \"fixable\". 6.6% risk of major adverse cardiac event per RCRI but likely less with low risk surgery under MAC/local.   RBBB.  Q waves in inferior, anterolateral leads (old).   -- instructed to continue cardiac meds for surgery due to ischemic CM. (lisinopril 2.5mg, crestor, aldactone, toprol)   2.  Pulm:  Airway feasible.  GREYSON risk: intermediate  3.  GI:  Risk of PONV score = 1.  If > 2, anti-emetic intervention recommended.  History of remote PUD/GIB 1999.   4.  :  CKD, stage III.  Cr 1.34 (stable) Uses Naproxen daily (counseled against but quality of life due to left knee pain.  Risks discussed).    VTE risk: 3% - dvt mechanical proph recommended.     Patient is optimized and is acceptable candidate for the proposed procedure.  No further diagnostic evaluation is needed.     Patient also evaluated by Dr. Ventura. See recommendations below.             Reviewed and Signed by PAC Mid-Level Provider/Resident  Mid-Level Provider/Resident: Maty Dominguez PA-C  Date: 12/22/16  Time: 1230    Attending Anesthesiologist Anesthesia Assessment:  89 year old for generator/battery change for DBS. Chart reviewed, patient seen and evaluated; agree with above assessment. Known but stable CAD, followed by cardiology. Agree with his and cardiology decision to not operate/repair his stable 5.1 cm AAA, as risk of rupture is low. This surgery will be MAC, local.    Patient is appropriate for the planned procedure without further workup or medical management change. The final anesthesia plan will be determined by the physician anesthesiologist caring for the patient on the day of surgery.    Reviewed and Signed by PAC Anesthesiologist  Anesthesiologist: Gracy Ventura MD  Date: 12/22/2016  Time:   Pass/Fail: Pass  Disposition:     PAC Pharmacist " Assessment:        Pharmacist:   Date:   Time:      Anesthesia Plan      History & Physical Review  History and physical reviewed and following examination; no interval change.    ASA Status:  3 .        Plan for MAC with Intravenous induction. Maintenance will be TIVA.    PONV prophylaxis:  Ondansetron (or other 5HT-3)  Additional equipment: 2nd IV      Postoperative Care  Postoperative pain management:  IV analgesics.      Consents                          .   yes

## 2023-11-17 NOTE — LETTER
4/25/2017    [facility]  [address] [place] [address]    RE: Paul Ingram       Dear Colleague,    REFERRING PHYSICIAN:  [facility]      It is my pleasure to see your patient, Paul Ingram, a very pleasant 89-year-old gentleman with a history of coronary artery disease with multiple stents placed in the past with an ischemic cardiomyopathy and a past history of congestive heart failure.  The patient also has a history of moderate aortic stenosis.  He has an abdominal aortic aneurysm.  Ultrasound of the abdomen was performed approximately a week ago showing an infrarenal fusiform aneurysm measuring 5.3 x 4.9 cm.  A year ago the aneurysm was 5.1 x 4.6 cm.  However, in 2014 the aneurysm measured 5.2 x 4.8 so it is quite similar to was what it was in 2014.  We are probably seeing inter-observer and inter-technician differences.  The patient was seen by Dr. Maury Llanos from our Vascular [facility] in 2014.  At that stage, Mr. Ingram clearly stated that he would not have any surgery done to that, that he would prefer to have it treated conservatively.  He is not complaining of abdominal discomfort and he is not complaining of back discomfort or flank pain.      His blood pressure appears to be on the higher side here today at 143/54.   I rechecked his blood pressure and got a blood pressure of 146/72 so he does appear to have higher blood pressure.  I do note that you did add in lisinopril 2.5 mg daily but it does appear that his blood pressure is still on the higher side.  It will be important to try and get the systolic blood pressure down below 120 because of the abdominal aortic aneurysm.      The patient's lipids on 04/19/2017 were good with an LDL of 72, HDL of 54 and triglycerides of 141.  His liver function tests are normal with an ALT of less than 5.        He is not complaining of chest pains or chest pressure or undue shortness of breath.  His last  Pt presents with c/o having bilateral neck pain and increased congestion   Pt denies any fevers, chills N/V/D   S/x started yesterday   No otc meds taken today          echocardiogram was in 2014 and at that stage he was noted to have moderate aortic stenosis with a valve area of 1.0 cm2 and a mean gradient of 16 mmHg.  His EF was felt to be 40%-45% at that time with moderate apical and severe inferolateral hypokinesis.      Outpatient Encounter Prescriptions as of 4/25/2017   Medication Sig Dispense Refill     lisinopril (PRINIVIL/ZESTRIL) 2.5 MG tablet Take 2 tablets (5 mg) by mouth daily 30 tablet 11     rosuvastatin (CRESTOR) 40 MG tablet Take 1 tablet (40 mg) by mouth At Bedtime 90 tablet 0     naproxen sodium (ALEVE) 220 MG tablet Take 1 tablet (220 mg) by mouth daily 60 tablet      isosorbide mononitrate (IMDUR) 30 MG 24 hr tablet Take 30 mg by mouth daily       spironolactone (ALDACTONE) 25 MG tablet Take 0.5 tablets (12.5 mg) by mouth daily 45 tablet 3     carboxymethylcellulose (REFRESH PLUS) 0.5 % SOLN Place 1 drop into both eyes 3 times daily as needed for dry eyes       fish oil-omega-3 fatty acids (FISH OIL) 1000 MG capsule Take 1 capsule by mouth 2 times daily.       primidone (MYSOLINE) 50 MG tablet Take 1 tablet by mouth 2 times daily.       metoprolol (TOPROL-XL) 25 MG 24 hr tablet Take 25 mg by mouth daily.       nitroglycerin (NITROSTAT) 0.4 MG SL tablet Place 1 tablet under the tongue every 5 minutes as needed.       [DISCONTINUED] lisinopril (PRINIVIL/ZESTRIL) 2.5 MG tablet Take 2.5 mg by mouth daily        No facility-administered encounter medications on file as of 4/25/2017.      IMPRESSION:   1.  Essential hypertension.  His blood pressure does not appear to be well controlled and this is important from the point of view of his abdominal aortic aneurysm.  I would like to see the systolic pressures in the 120s.  This is despite the fact that lisinopril was recently introduced at the 2.5 mg dose.   2.  Coronary artery disease.  The patient is asymptomatic with respect to coronary artery disease with no symptoms of angina pectoris.   3.  Ischemic  cardiomyopathy.  The patient's ejection fraction is 40%-45%.  He has no symptoms or signs of congestive heart failure.   4.  Moderate aortic stenosis with the last echocardiogram being almost 3 years ago.   5.  Hyperlipidemia.  His lipids are excellent.     6.  Infrarenal abdominal aortic aneurysm which is quite similar in size to what it was in 2014.  The patient has no symptoms from this and wants conservative therapy alone.   7.  Renal insufficiency.      PLAN:  I will increase the dose of lisinopril from 2.5 mg to 5 mg.  I will have the patient follow up in 1-2 weeks' time in [place] where he is close to and we will check a basic metabolic profile and blood pressure at that time and we will also check an echocardiogram to see his aortic valve.  I will follow with the patient myself in 6 months' time.      It has been a pleasure to be involved in the care of this very nice patient.  As always, he has been told to contact me if has any questions or concerns.     Sincerely,    Shady Dela Cruz MD